# Patient Record
Sex: MALE | Race: WHITE | Employment: OTHER | URBAN - METROPOLITAN AREA
[De-identification: names, ages, dates, MRNs, and addresses within clinical notes are randomized per-mention and may not be internally consistent; named-entity substitution may affect disease eponyms.]

---

## 2020-09-10 RX ORDER — GABAPENTIN 600 MG/1
900 TABLET ORAL DAILY
COMMUNITY

## 2020-09-10 RX ORDER — ATORVASTATIN CALCIUM 80 MG/1
80 TABLET, FILM COATED ORAL EVERY EVENING
COMMUNITY

## 2020-09-10 RX ORDER — LISINOPRIL 10 MG/1
30 TABLET ORAL DAILY
COMMUNITY
End: 2021-06-04

## 2020-09-10 NOTE — PROGRESS NOTES
C-Difficile admission screening and protocol:     * Admitted with diarrhea? no     *Prior history of C-Diff. In last 3 months? no     *Antibiotic use in the past 6-8 weeks? no     *Prior hospitalization or nursing home in the last month?     no
Preoperative Screening for Elective Surgery/Invasive Procedures While COVID-19 present in the community     Have you tested positive or have been told to self-isolate for COVID-19 like symptoms within the past 28 days? no   Do you currently have any of the following symptoms? o Fever >100.0 F or 99.9 F in immunocompromised patients? no  o New onset cough, shortness of breath or difficulty breathing? no  o New onset sore throat, myalgia (muscle aches and pains), headache, loss of taste/smell or diarrhea? no   Have you had a potential exposure to COVID-19 within the past 14 days by:  o Close contact with a confirmed case? no  o Close contact with a healthcare worker,  or essential infrastructure worker (grocery store, restaurant, gas station, public utilities or transportation)? no  o Do you reside in a congregate setting such as; skilled nursing facility, adult home, correctional facility, homeless shelter or other institutional setting? no  o Have you had recent travel to a known COVID-19 hotspot? no    Indicate if the patient has a positive screen by answering yes to one or more of the above questions. Patients who test positive or screen positive prior to surgery or on the day of surgery should be evaluated in conjunction with the surgeon/proceduralist/anesthesiologist to determine the urgency of the procedure.
polish on your fingers or toes      For your safety, please do not wear any jewelry or body piercing's on the day of surgery. All jewelry must be removed. If you have dentures, they will be removed before going to operating room. For your convenience, we will provide you with a container. If you wear contact lenses or glasses, they will be removed, please bring a case for them. If you have a living will and a durable power of  for healthcare, please bring in a copy. As part of our patient safety program to minimize surgical site infections, we ask you to do the following:    · Please notify your surgeon if you develop any illness between         now and the  day of your surgery. · This includes a cough, cold, fever, sore throat, nausea,         or vomiting, and diarrhea, etc.  ·  Please notify your surgeon if you experience dizziness, shortness         of breath or blurred vision between now and the time of your surgery. Do not shave your operative site 96 hours prior to surgery. For face and neck surgery, men may use an electric razor 48 hours   prior to surgery. You may shower the night before surgery or the morning of   your surgery with an antibacterial soap. You will need to bring a photo ID and insurance card    [facility] has an onsite pharmacy, would you like to utilize our pharmacy     If you will be staying overnight and use a C-pap machine, please bring   your C-pap to hospital     Our goal is to provide you with excellent care, therefore, visitors will be limited to two(2) in the room at a time so that we may focus on providing this care for you. Please contact pre-admission testing if you have any further questions. [facility] phone number:  [phone]  Please note these are generalized instructions for all surgical cases, you may be provided with more specific instructions according to your surgery.

## 2020-09-14 ENCOUNTER — ANESTHESIA EVENT (OUTPATIENT)
Dept: SURGERY | Age: 65
End: 2020-09-14
Payer: MEDICARE

## 2020-09-14 ENCOUNTER — PREP FOR PROCEDURE (OUTPATIENT)
Dept: OPHTHALMOLOGY | Age: 65
End: 2020-09-14

## 2020-09-14 RX ORDER — CYCLOPENTOLATE HYDROCHLORIDE 10 MG/ML
1 SOLUTION/ DROPS OPHTHALMIC SEE ADMIN INSTRUCTIONS
Status: CANCELLED | OUTPATIENT
Start: 2020-09-14

## 2020-09-14 RX ORDER — PHENYLEPHRINE HYDROCHLORIDE 25 MG/ML
1 SOLUTION/ DROPS OPHTHALMIC SEE ADMIN INSTRUCTIONS
Status: CANCELLED | OUTPATIENT
Start: 2020-09-14

## 2020-09-14 RX ORDER — TETRACAINE HYDROCHLORIDE 5 MG/ML
1 SOLUTION OPHTHALMIC SEE ADMIN INSTRUCTIONS
Status: CANCELLED | OUTPATIENT
Start: 2020-09-14

## 2020-09-14 RX ORDER — SODIUM CHLORIDE 0.9 % (FLUSH) 0.9 %
10 SYRINGE (ML) INJECTION EVERY 12 HOURS SCHEDULED
Status: CANCELLED | OUTPATIENT
Start: 2020-09-14

## 2020-09-14 RX ORDER — SODIUM CHLORIDE 0.9 % (FLUSH) 0.9 %
10 SYRINGE (ML) INJECTION PRN
Status: CANCELLED | OUTPATIENT
Start: 2020-09-14

## 2020-09-14 RX ORDER — CIPROFLOXACIN HYDROCHLORIDE 3.5 MG/ML
1 SOLUTION/ DROPS TOPICAL SEE ADMIN INSTRUCTIONS
Status: CANCELLED | OUTPATIENT
Start: 2020-09-14

## 2020-09-15 ENCOUNTER — HOSPITAL ENCOUNTER (OUTPATIENT)
Age: 65
Setting detail: OUTPATIENT SURGERY
Discharge: HOME | End: 2020-09-15
Attending: OPHTHALMOLOGY | Admitting: OPHTHALMOLOGY
Payer: MEDICARE

## 2020-09-15 ENCOUNTER — ANESTHESIA (OUTPATIENT)
Dept: SURGERY | Age: 65
End: 2020-09-15
Payer: MEDICARE

## 2020-09-15 VITALS
OXYGEN SATURATION: 95 % | RESPIRATION RATE: 14 BRPM | DIASTOLIC BLOOD PRESSURE: 80 MMHG | SYSTOLIC BLOOD PRESSURE: 130 MMHG

## 2020-09-15 VITALS
TEMPERATURE: 97.2 F | BODY MASS INDEX: 26.37 KG/M2 | RESPIRATION RATE: 15 BRPM | HEART RATE: 66 BPM | OXYGEN SATURATION: 94 % | DIASTOLIC BLOOD PRESSURE: 80 MMHG | WEIGHT: 168 LBS | HEIGHT: 67 IN | SYSTOLIC BLOOD PRESSURE: 140 MMHG

## 2020-09-15 LAB
GLUCOSE BLD-MCNC: 117 MG/DL (ref 70–99)
PERFORMED ON: ABNORMAL

## 2020-09-15 PROCEDURE — 6360000002 HC RX W HCPCS: Performed by: NURSE ANESTHETIST, CERTIFIED REGISTERED

## 2020-09-15 PROCEDURE — V2632 POST CHMBR INTRAOCULAR LENS: HCPCS | Performed by: OPHTHALMOLOGY

## 2020-09-15 PROCEDURE — 3700000001 HC ADD 15 MINUTES (ANESTHESIA): Performed by: OPHTHALMOLOGY

## 2020-09-15 PROCEDURE — 6360000002 HC RX W HCPCS: Performed by: OPHTHALMOLOGY

## 2020-09-15 PROCEDURE — 2720000010 HC SURG SUPPLY STERILE: Performed by: OPHTHALMOLOGY

## 2020-09-15 PROCEDURE — 3600000004 HC SURGERY LEVEL 4 BASE: Performed by: OPHTHALMOLOGY

## 2020-09-15 PROCEDURE — 2580000003 HC RX 258: Performed by: ANESTHESIOLOGY

## 2020-09-15 PROCEDURE — 6370000000 HC RX 637 (ALT 250 FOR IP): Performed by: OPHTHALMOLOGY

## 2020-09-15 PROCEDURE — 3700000000 HC ANESTHESIA ATTENDED CARE: Performed by: OPHTHALMOLOGY

## 2020-09-15 PROCEDURE — 2500000003 HC RX 250 WO HCPCS: Performed by: OPHTHALMOLOGY

## 2020-09-15 PROCEDURE — 3600000014 HC SURGERY LEVEL 4 ADDTL 15MIN: Performed by: OPHTHALMOLOGY

## 2020-09-15 PROCEDURE — 7100000010 HC PHASE II RECOVERY - FIRST 15 MIN: Performed by: OPHTHALMOLOGY

## 2020-09-15 PROCEDURE — 2709999900 HC NON-CHARGEABLE SUPPLY: Performed by: OPHTHALMOLOGY

## 2020-09-15 DEVICE — LENS INTRAOCULAR 1 PC 21+ DIOPT 6X12.5 MM POST CHMBR FLD: Type: IMPLANTABLE DEVICE | Site: EYE | Status: FUNCTIONAL

## 2020-09-15 RX ORDER — PHENYLEPHRINE HCL 2.5 %
1 DROPS OPHTHALMIC (EYE) SEE ADMIN INSTRUCTIONS
Status: DISCONTINUED | OUTPATIENT
Start: 2020-09-15 | End: 2020-09-15 | Stop reason: HOSPADM

## 2020-09-15 RX ORDER — TETRACAINE HYDROCHLORIDE 5 MG/ML
1 SOLUTION OPHTHALMIC SEE ADMIN INSTRUCTIONS
Status: DISCONTINUED | OUTPATIENT
Start: 2020-09-15 | End: 2020-09-15 | Stop reason: HOSPADM

## 2020-09-15 RX ORDER — SODIUM CHLORIDE 0.9 % (FLUSH) 0.9 %
10 SYRINGE (ML) INJECTION EVERY 12 HOURS SCHEDULED
Status: DISCONTINUED | OUTPATIENT
Start: 2020-09-15 | End: 2020-09-15 | Stop reason: HOSPADM

## 2020-09-15 RX ORDER — SODIUM CHLORIDE 0.9 % (FLUSH) 0.9 %
10 SYRINGE (ML) INJECTION PRN
Status: DISCONTINUED | OUTPATIENT
Start: 2020-09-15 | End: 2020-09-15 | Stop reason: HOSPADM

## 2020-09-15 RX ORDER — CIPROFLOXACIN HYDROCHLORIDE 3.5 MG/ML
1 SOLUTION/ DROPS TOPICAL
Status: COMPLETED | OUTPATIENT
Start: 2020-09-15 | End: 2020-09-15

## 2020-09-15 RX ORDER — MIDAZOLAM HYDROCHLORIDE 1 MG/ML
INJECTION INTRAMUSCULAR; INTRAVENOUS PRN
Status: DISCONTINUED | OUTPATIENT
Start: 2020-09-15 | End: 2020-09-15 | Stop reason: SDUPTHER

## 2020-09-15 RX ORDER — OFLOXACIN 3 MG/ML
SOLUTION/ DROPS OPHTHALMIC
Status: COMPLETED | OUTPATIENT
Start: 2020-09-15 | End: 2020-09-15

## 2020-09-15 RX ORDER — CYCLOPENTOLATE HYDROCHLORIDE 10 MG/ML
1 SOLUTION/ DROPS OPHTHALMIC SEE ADMIN INSTRUCTIONS
Status: DISCONTINUED | OUTPATIENT
Start: 2020-09-15 | End: 2020-09-15 | Stop reason: HOSPADM

## 2020-09-15 RX ORDER — SODIUM CHLORIDE 0.9 % (FLUSH) 0.9 %
10 SYRINGE (ML) INJECTION EVERY 12 HOURS SCHEDULED
Status: DISCONTINUED | OUTPATIENT
Start: 2020-09-15 | End: 2020-09-15 | Stop reason: SDUPTHER

## 2020-09-15 RX ORDER — SODIUM CHLORIDE 9 MG/ML
INJECTION, SOLUTION INTRAVENOUS CONTINUOUS
Status: DISCONTINUED | OUTPATIENT
Start: 2020-09-15 | End: 2020-09-15 | Stop reason: HOSPADM

## 2020-09-15 RX ORDER — SODIUM CHLORIDE 0.9 % (FLUSH) 0.9 %
10 SYRINGE (ML) INJECTION PRN
Status: DISCONTINUED | OUTPATIENT
Start: 2020-09-15 | End: 2020-09-15 | Stop reason: DRUGHIGH

## 2020-09-15 RX ORDER — TETRACAINE HYDROCHLORIDE 5 MG/ML
SOLUTION OPHTHALMIC
Status: COMPLETED | OUTPATIENT
Start: 2020-09-15 | End: 2020-09-15

## 2020-09-15 RX ORDER — AMLODIPINE BESYLATE 5 MG/1
5 TABLET ORAL DAILY
COMMUNITY
Start: 2020-09-10 | End: 2021-06-04

## 2020-09-15 RX ORDER — BRIMONIDINE TARTRATE 2 MG/ML
SOLUTION/ DROPS OPHTHALMIC
Status: COMPLETED | OUTPATIENT
Start: 2020-09-15 | End: 2020-09-15

## 2020-09-15 RX ADMIN — CIPROFLOXACIN HYDROCHLORIDE 1 DROP: 3 SOLUTION/ DROPS OPHTHALMIC at 07:20

## 2020-09-15 RX ADMIN — CIPROFLOXACIN HYDROCHLORIDE 1 DROP: 3 SOLUTION/ DROPS OPHTHALMIC at 07:15

## 2020-09-15 RX ADMIN — CYCLOPENTOLATE HYDROCHLORIDE 1 DROP: 10 SOLUTION/ DROPS OPHTHALMIC at 07:16

## 2020-09-15 RX ADMIN — MIDAZOLAM 2 MG: 1 INJECTION INTRAMUSCULAR; INTRAVENOUS at 08:54

## 2020-09-15 RX ADMIN — CYCLOPENTOLATE HYDROCHLORIDE 1 DROP: 10 SOLUTION/ DROPS OPHTHALMIC at 07:26

## 2020-09-15 RX ADMIN — SODIUM CHLORIDE: 9 INJECTION, SOLUTION INTRAVENOUS at 07:31

## 2020-09-15 RX ADMIN — PHENYLEPHRINE HYDROCHLORIDE 1 DROP: 25 SOLUTION/ DROPS OPHTHALMIC at 07:28

## 2020-09-15 RX ADMIN — SODIUM CHLORIDE: 9 INJECTION, SOLUTION INTRAVENOUS at 08:03

## 2020-09-15 RX ADMIN — PHENYLEPHRINE HYDROCHLORIDE 1 DROP: 25 SOLUTION/ DROPS OPHTHALMIC at 07:17

## 2020-09-15 RX ADMIN — TETRACAINE HYDROCHLORIDE 1 DROP: 5 SOLUTION OPHTHALMIC at 07:14

## 2020-09-15 ASSESSMENT — PULMONARY FUNCTION TESTS
PIF_VALUE: 1
PIF_VALUE: 0
PIF_VALUE: 1
PIF_VALUE: 0
PIF_VALUE: 1

## 2020-09-15 ASSESSMENT — PAIN SCALES - GENERAL
PAINLEVEL_OUTOF10: 0
PAINLEVEL_OUTOF10: 0

## 2020-09-15 ASSESSMENT — PAIN - FUNCTIONAL ASSESSMENT: PAIN_FUNCTIONAL_ASSESSMENT: 0-10

## 2020-09-15 ASSESSMENT — ENCOUNTER SYMPTOMS: SHORTNESS OF BREATH: 0

## 2020-09-15 NOTE — OP NOTE
capsular bag. Provisc was removed from posterior to the implant and anterior to the implant by using a Cricket irrigation/aspiration hand piece. 0.2 ml Cefuroxime was placed in the anterior chamber. The corneal incision was checked and the incision was watertight without suture. The wire lid speculum was removed and the patient received topical Ciprofloxacin, and Alphagan P drops. At the conclusion of the procedure, the cornea was clear and the anterior chamber was deep, the pupil was round, and the implant was in good position. The patient tolerated the procedure well and was returned to the recovery room in good condition to be seen for follow-up the next day. ATTENDING ATTESTATION: I was present and scrubbed for the entire procedure.       ELECTRONICALLY SIGNED BY: Neftaly Yoon, 9/15/2020 9:25 AM

## 2020-09-15 NOTE — H&P
Date of Surgery Update:  Mony Orozco was seen, history and physical examination reviewed, and patient examined by me today. There have been no significant clinical changes since the completion of the previous history and physical.    The risk, benefits, and alternatives of the proposed procedure have been explained to the patient (or appropriate guardian) and understanding verbalized. All questions answered. Patient wishes to proceed.     Electronically signed by: Yaquelin Arias MD,9/15/2020,8:11 AM

## 2020-09-15 NOTE — ANESTHESIA PRE PROCEDURE
[facility] Department of Anesthesiology  Pre-Anesthesia Evaluation/Consultation       Name:  Jorden Villa  : 1955  Age:  75 y.o. MRN:  [id number]  Date: 9/15/2020           Surgeon: Surgeon(s):  Yu Ríos MD    Procedure: Procedure(s):  LEFT EYE Phacoemulsification with intraocular lens implant     No Known Allergies  There is no problem list on file for this patient. Past Medical History:   Diagnosis Date    Diabetes mellitus ([address]     Hepatitis C     was treated     Hyperlipidemia     Hypertension     Kidney damage     Neuropathy     feet     Past Surgical History:   Procedure Laterality Date    ABDOMINAL AORTIC ANEURYSM REPAIR      BACK SURGERY      pinched nerve     BACK SURGERY      COLONOSCOPY       Social History     Tobacco Use    Smoking status: Former Smoker     Types: Cigarettes    Smokeless tobacco: Never Used    Tobacco comment: quit 10 yrs ago    Substance Use Topics    Alcohol use: Yes     Comment: seldom     Drug use: Never     Medications  No current facility-administered medications on file prior to encounter. Current Outpatient Medications on File Prior to Encounter   Medication Sig Dispense Refill    amLODIPine (NORVASC) 5 MG tablet Take 5 mg by mouth daily      metFORMIN (GLUCOPHAGE) 500 MG tablet Take 500 mg by mouth 2 times daily (with meals)      gabapentin (NEURONTIN) 600 MG tablet Take 900 mg by mouth daily.       atorvastatin (LIPITOR) 80 MG tablet Take 80 mg by mouth daily      lisinopril (PRINIVIL;ZESTRIL) 10 MG tablet Take 30 mg by mouth daily Indications: takes in evening        Current Facility-Administered Medications   Medication Dose Route Frequency Provider Last Rate Last Dose    0.9 % sodium chloride infusion   Intravenous Continuous Ana Ventura  mL/hr at 09/15/20 0731      sodium chloride flush 0.9 % injection 10 mL  10 mL Intravenous 2 times per day Ana Ventura MD        sodium ALKPHOS, AST, ALT  BMP  No results found for: NA, K, CL, CO2, BUN, CREATININE, CALCIUM, GFRAA, LABGLOM, GLUCOSE  POCGlucose  No results for input(s): GLUCOSE in the last 72 hours. Coags  No results found for: PROTIME, INR, APTT  HCG (If Applicable) No results found for: PREGTESTUR, PREGSERUM, HCG, HCGQUANT   ABGs No results found for: PHART, PO2ART, VRP5KMG, TIU1WIM, BEART, S8LEUOZD   Type & Screen (If Applicable)  No results found for: LABABO, LABRH                         BMI: Wt Readings from Last 3 Encounters:       NPO Status:   Date of last liquid consumption: 09/15/20   Time of last liquid consumption: 0600(sip of water with meds)   Date of last solid food consumption: 09/14/20      Time of last solid consumption: 2130       Anesthesia Evaluation  Patient summary reviewed  Airway: Mallampati: II  TM distance: >3 FB   Neck ROM: full  Mouth opening: > = 3 FB Dental:          Pulmonary:       (-) COPD, asthma and shortness of breath                           Cardiovascular:    (+) hypertension:,     (-) valvular problems/murmurs, past MI, CAD, CABG/stent, dysrhythmias and  angina                Neuro/Psych:      (-) seizures, TIA and CVA           GI/Hepatic/Renal:   (+) hepatitis: C, liver disease:,      (-) GERD, PUD and no renal disease       Endo/Other:    (+) Diabetes, . Abdominal:           Vascular: negative vascular ROS. Anesthesia Plan      MAC     ASA 3     (I discussed local anesthesia with intravenous sedation to the   patient's satisfaction and agreed including risks and alternatives. The  patient has no further questions. HILDA THOMAS )  Induction: intravenous. Anesthetic plan and risks discussed with patient. Plan discussed with CRNA.                   This pre-anesthesia assessment may be used as a history and physical.    DOS STAFF ADDENDUM:    Pt seen and examined, chart reviewed (including anesthesia, drug and allergy history). No interval changes to history and physical examination. Anesthetic plan, risks, benefits, alternatives, and personnel involved discussed with patient. Patient verbalized an understanding and agrees to proceed.       Saran Yeboah MD  September 15, 2020  7:34 AM

## 2020-09-15 NOTE — ANESTHESIA POSTPROCEDURE EVALUATION
stable  OK to discharge from Stage I post anesthesia care.   Care transferred from Anesthesiology department on discharge from perioperative area

## 2020-09-22 NOTE — PROGRESS NOTES
C-Difficile admission screening and protocol:     * Admitted with diarrhea? no     *Prior history of C-Diff. In last 3 months? no     *Antibiotic use in the past 6-8 weeks? Yes eye drops     *Prior hospitalization or nursing home in the last month?     no

## 2020-09-22 NOTE — PROGRESS NOTES
[address]  time___0720_________        Surgery time__0850__________    Take the following medications with a sip of water: amlodipine     Do not eat or drink anything after 12:00 midnight prior to your surgery. This includes water chewing gum, mints and ice chips. You may brush your teeth and gargle the morning of your surgery, but do not swallow the water     Please see your family doctor/pediatrician for a history and physical and/or concerning medications. H&P 9/3/2020 Dr. Villanueva Balloon    Bring any test results/reports from your physicians office. If you are under the care of a heart doctor or specialist doctor, please be aware that you may be asked to them for clearance    You may be asked to stop blood thinners such as Coumadin, Plavix, Fragmin, Lovenox, etc., or any anti-inflammatories such as:  Aspirin, Ibuprofen, Advil, Naproxen prior to your surgery. We also ask that you stop any OTC medications such as fish oil, vitamin E, glucosamine, garlic, Multivitamins, COQ 10, etc.    We ask that you do not smoke 24 hours prior to surgery  We ask that you do not  drink any alcoholic beverages 24 hours prior to surgery     You must make arrangements for a responsible adult to take you home after your surgery. For your safety you will not be allowed to leave alone or drive yourself home. Your surgery will be cancelled if you do not have a ride home. Also for your safety, it is strongly suggested that someone stay with you the first 24 hours after your surgery. A parent or legal guardian must accompany a child scheduled for surgery and plan to stay at the hospital until the child is discharged. Please do not bring other children with you. For your comfort, please wear simple loose fitting clothing to the hospital.  Please do not bring valuables. Wear short sleeve button down shirt or loose fitting shirt. Bting eye drops or ointment.     Do not wear any make-up or nail polish on your fingers or toes      For your safety, please do not wear any jewelry or body piercing's on the day of surgery. All jewelry must be removed. If you have dentures, they will be removed before going to operating room. For your convenience, we will provide you with a container. If you wear contact lenses or glasses, they will be removed, please bring a case for them. If you have a living will and a durable power of  for healthcare, please bring in a copy. As part of our patient safety program to minimize surgical site infections, we ask you to do the following:    · Please notify your surgeon if you develop any illness between         now and the  day of your surgery. · This includes a cough, cold, fever, sore throat, nausea,         or vomiting, and diarrhea, etc.  ·  Please notify your surgeon if you experience dizziness, shortness         of breath or blurred vision between now and the time of your surgery. Do not shave your operative site 96 hours prior to surgery. For face and neck surgery, men may use an electric razor 48 hours   prior to surgery. You may shower the night before surgery or the morning of   your surgery with an antibacterial soap. You will need to bring a photo ID and insurance card    [facility] has an onsite pharmacy, would you like to utilize our pharmacy     If you will be staying overnight and use a C-pap machine, please bring   your C-pap to hospital     Our goal is to provide you with excellent care, therefore, visitors will be limited to two(2) in the room at a time so that we may focus on providing this care for you. Please contact pre-admission testing if you have any further questions. [facility] phone number:  [phone]  Please note these are generalized instructions for all surgical cases, you may be provided with more specific instructions according to your surgery.

## 2020-09-28 ENCOUNTER — ANESTHESIA EVENT (OUTPATIENT)
Dept: SURGERY | Age: 65
End: 2020-09-28
Payer: MEDICARE

## 2020-09-28 RX ORDER — SODIUM CHLORIDE 0.9 % (FLUSH) 0.9 %
10 SYRINGE (ML) INJECTION PRN
Status: CANCELLED | OUTPATIENT
Start: 2020-09-28

## 2020-09-28 RX ORDER — CIPROFLOXACIN HYDROCHLORIDE 3.5 MG/ML
1 SOLUTION/ DROPS TOPICAL SEE ADMIN INSTRUCTIONS
Status: CANCELLED | OUTPATIENT
Start: 2020-09-28

## 2020-09-28 RX ORDER — TETRACAINE HYDROCHLORIDE 5 MG/ML
1 SOLUTION OPHTHALMIC SEE ADMIN INSTRUCTIONS
Status: CANCELLED | OUTPATIENT
Start: 2020-09-28

## 2020-09-28 RX ORDER — PHENYLEPHRINE HYDROCHLORIDE 25 MG/ML
1 SOLUTION/ DROPS OPHTHALMIC SEE ADMIN INSTRUCTIONS
Status: CANCELLED | OUTPATIENT
Start: 2020-09-28

## 2020-09-28 RX ORDER — SODIUM CHLORIDE 0.9 % (FLUSH) 0.9 %
10 SYRINGE (ML) INJECTION EVERY 12 HOURS SCHEDULED
Status: CANCELLED | OUTPATIENT
Start: 2020-09-28

## 2020-09-28 RX ORDER — CYCLOPENTOLATE HYDROCHLORIDE 10 MG/ML
1 SOLUTION/ DROPS OPHTHALMIC SEE ADMIN INSTRUCTIONS
Status: CANCELLED | OUTPATIENT
Start: 2020-09-28

## 2020-09-28 NOTE — PROGRESS NOTES
[address]        Pre-Op Phone Call:     Patient Name: Pedro Mae     Telephone Information:   Mobile [phone]     Home phone:  [phone]    Surgery Time:    8:50 AM     Arrival Time:  0720   Left extended Message:  NA     Message left with:     Recent change in health status:  No     Advised of transportation/ policy:  Yes     NPO policy reviewed:  Yes pt     Advised to take morning heart/blood pressure medications with sips of water morning of surgery? Yes     Instructed to bring eye drops, photo identification, and insurance card day of surgery? Yes     Advised to wear short sleeved button down shirt (no T-shirt underneath):  Yes     Advised not to wear jewelry, hairpins, or pantyhose day of surgery? Yes     Advised not to wear make-up and to wash face day of surgery?   Yes    Remarks:        Electronically signed by:  Aaliyah Curiel RN at 9/28/2020 11:50 AM

## 2020-09-29 ENCOUNTER — HOSPITAL ENCOUNTER (OUTPATIENT)
Age: 65
Setting detail: OUTPATIENT SURGERY
Discharge: HOME | End: 2020-09-29
Attending: OPHTHALMOLOGY | Admitting: OPHTHALMOLOGY
Payer: MEDICARE

## 2020-09-29 ENCOUNTER — ANESTHESIA (OUTPATIENT)
Dept: SURGERY | Age: 65
End: 2020-09-29
Payer: MEDICARE

## 2020-09-29 VITALS
DIASTOLIC BLOOD PRESSURE: 77 MMHG | OXYGEN SATURATION: 95 % | BODY MASS INDEX: 26.37 KG/M2 | SYSTOLIC BLOOD PRESSURE: 131 MMHG | RESPIRATION RATE: 16 BRPM | WEIGHT: 168 LBS | HEART RATE: 66 BPM | TEMPERATURE: 97 F | HEIGHT: 67 IN

## 2020-09-29 VITALS
RESPIRATION RATE: 16 BRPM | SYSTOLIC BLOOD PRESSURE: 164 MMHG | DIASTOLIC BLOOD PRESSURE: 84 MMHG | OXYGEN SATURATION: 99 %

## 2020-09-29 LAB
GLUCOSE BLD-MCNC: 109 MG/DL (ref 70–99)
GLUCOSE BLD-MCNC: 111 MG/DL (ref 70–99)
PERFORMED ON: ABNORMAL
PERFORMED ON: ABNORMAL

## 2020-09-29 PROCEDURE — 6360000002 HC RX W HCPCS: Performed by: NURSE ANESTHETIST, CERTIFIED REGISTERED

## 2020-09-29 PROCEDURE — 2580000003 HC RX 258: Performed by: ANESTHESIOLOGY

## 2020-09-29 PROCEDURE — 2720000010 HC SURG SUPPLY STERILE: Performed by: OPHTHALMOLOGY

## 2020-09-29 PROCEDURE — 6370000000 HC RX 637 (ALT 250 FOR IP): Performed by: OPHTHALMOLOGY

## 2020-09-29 PROCEDURE — V2632 POST CHMBR INTRAOCULAR LENS: HCPCS | Performed by: OPHTHALMOLOGY

## 2020-09-29 PROCEDURE — 3700000001 HC ADD 15 MINUTES (ANESTHESIA): Performed by: OPHTHALMOLOGY

## 2020-09-29 PROCEDURE — 3700000000 HC ANESTHESIA ATTENDED CARE: Performed by: OPHTHALMOLOGY

## 2020-09-29 PROCEDURE — 2500000003 HC RX 250 WO HCPCS: Performed by: OPHTHALMOLOGY

## 2020-09-29 PROCEDURE — 3600000014 HC SURGERY LEVEL 4 ADDTL 15MIN: Performed by: OPHTHALMOLOGY

## 2020-09-29 PROCEDURE — 2709999900 HC NON-CHARGEABLE SUPPLY: Performed by: OPHTHALMOLOGY

## 2020-09-29 PROCEDURE — 3600000004 HC SURGERY LEVEL 4 BASE: Performed by: OPHTHALMOLOGY

## 2020-09-29 PROCEDURE — 7100000011 HC PHASE II RECOVERY - ADDTL 15 MIN: Performed by: OPHTHALMOLOGY

## 2020-09-29 PROCEDURE — 7100000010 HC PHASE II RECOVERY - FIRST 15 MIN: Performed by: OPHTHALMOLOGY

## 2020-09-29 PROCEDURE — 6360000002 HC RX W HCPCS: Performed by: OPHTHALMOLOGY

## 2020-09-29 DEVICE — LENS INTRAOCULAR BCNVX +20.5 DIOPT 6X12.5 MM ENVISTA: Type: IMPLANTABLE DEVICE | Site: EYE | Status: FUNCTIONAL

## 2020-09-29 RX ORDER — PHENYLEPHRINE HCL 2.5 %
1 DROPS OPHTHALMIC (EYE) SEE ADMIN INSTRUCTIONS
Status: COMPLETED | OUTPATIENT
Start: 2020-09-29 | End: 2020-09-29

## 2020-09-29 RX ORDER — MIDAZOLAM HYDROCHLORIDE 1 MG/ML
INJECTION INTRAMUSCULAR; INTRAVENOUS PRN
Status: DISCONTINUED | OUTPATIENT
Start: 2020-09-29 | End: 2020-09-29 | Stop reason: SDUPTHER

## 2020-09-29 RX ORDER — SODIUM CHLORIDE 0.9 % (FLUSH) 0.9 %
10 SYRINGE (ML) INJECTION EVERY 12 HOURS SCHEDULED
Status: DISCONTINUED | OUTPATIENT
Start: 2020-09-29 | End: 2020-09-29 | Stop reason: HOSPADM

## 2020-09-29 RX ORDER — SODIUM CHLORIDE 0.9 % (FLUSH) 0.9 %
10 SYRINGE (ML) INJECTION PRN
Status: DISCONTINUED | OUTPATIENT
Start: 2020-09-29 | End: 2020-09-29 | Stop reason: HOSPADM

## 2020-09-29 RX ORDER — TETRACAINE HYDROCHLORIDE 5 MG/ML
SOLUTION OPHTHALMIC
Status: COMPLETED | OUTPATIENT
Start: 2020-09-29 | End: 2020-09-29

## 2020-09-29 RX ORDER — SODIUM CHLORIDE 9 MG/ML
INJECTION, SOLUTION INTRAVENOUS CONTINUOUS
Status: DISCONTINUED | OUTPATIENT
Start: 2020-09-29 | End: 2020-09-29 | Stop reason: HOSPADM

## 2020-09-29 RX ORDER — PROMETHAZINE HYDROCHLORIDE 25 MG/ML
6.25 INJECTION, SOLUTION INTRAMUSCULAR; INTRAVENOUS
Status: DISCONTINUED | OUTPATIENT
Start: 2020-09-29 | End: 2020-09-29 | Stop reason: HOSPADM

## 2020-09-29 RX ORDER — TETRACAINE HYDROCHLORIDE 5 MG/ML
1 SOLUTION OPHTHALMIC SEE ADMIN INSTRUCTIONS
Status: DISCONTINUED | OUTPATIENT
Start: 2020-09-29 | End: 2020-09-29 | Stop reason: HOSPADM

## 2020-09-29 RX ORDER — CYCLOPENTOLATE HYDROCHLORIDE 10 MG/ML
1 SOLUTION/ DROPS OPHTHALMIC SEE ADMIN INSTRUCTIONS
Status: COMPLETED | OUTPATIENT
Start: 2020-09-29 | End: 2020-09-29

## 2020-09-29 RX ORDER — FENTANYL CITRATE 50 UG/ML
INJECTION, SOLUTION INTRAMUSCULAR; INTRAVENOUS PRN
Status: DISCONTINUED | OUTPATIENT
Start: 2020-09-29 | End: 2020-09-29 | Stop reason: SDUPTHER

## 2020-09-29 RX ORDER — SODIUM CHLORIDE 0.9 % (FLUSH) 0.9 %
10 SYRINGE (ML) INJECTION PRN
Status: DISCONTINUED | OUTPATIENT
Start: 2020-09-29 | End: 2020-09-29 | Stop reason: SDUPTHER

## 2020-09-29 RX ORDER — CIPROFLOXACIN HYDROCHLORIDE 3.5 MG/ML
SOLUTION/ DROPS TOPICAL
Status: COMPLETED | OUTPATIENT
Start: 2020-09-29 | End: 2020-09-29

## 2020-09-29 RX ORDER — SODIUM CHLORIDE 0.9 % (FLUSH) 0.9 %
10 SYRINGE (ML) INJECTION EVERY 12 HOURS SCHEDULED
Status: DISCONTINUED | OUTPATIENT
Start: 2020-09-29 | End: 2020-09-29 | Stop reason: SDUPTHER

## 2020-09-29 RX ORDER — ONDANSETRON 2 MG/ML
4 INJECTION INTRAMUSCULAR; INTRAVENOUS
Status: DISCONTINUED | OUTPATIENT
Start: 2020-09-29 | End: 2020-09-29 | Stop reason: HOSPADM

## 2020-09-29 RX ORDER — PREDNISOLONE ACETATE 10 MG/ML
SUSPENSION/ DROPS OPHTHALMIC
Status: DISCONTINUED | OUTPATIENT
Start: 2020-09-29 | End: 2020-09-29 | Stop reason: ALTCHOICE

## 2020-09-29 RX ORDER — CIPROFLOXACIN HYDROCHLORIDE 3.5 MG/ML
1 SOLUTION/ DROPS TOPICAL
Status: ACTIVE | OUTPATIENT
Start: 2020-09-29 | End: 2020-09-29

## 2020-09-29 RX ORDER — BRIMONIDINE TARTRATE 2 MG/ML
SOLUTION/ DROPS OPHTHALMIC
Status: COMPLETED | OUTPATIENT
Start: 2020-09-29 | End: 2020-09-29

## 2020-09-29 RX ORDER — LABETALOL HYDROCHLORIDE 5 MG/ML
5 INJECTION, SOLUTION INTRAVENOUS EVERY 10 MIN PRN
Status: DISCONTINUED | OUTPATIENT
Start: 2020-09-29 | End: 2020-09-29 | Stop reason: HOSPADM

## 2020-09-29 RX ADMIN — PHENYLEPHRINE HYDROCHLORIDE 1 DROP: 25 SOLUTION/ DROPS OPHTHALMIC at 07:49

## 2020-09-29 RX ADMIN — CYCLOPENTOLATE HYDROCHLORIDE 1 DROP: 10 SOLUTION/ DROPS OPHTHALMIC at 07:35

## 2020-09-29 RX ADMIN — PHENYLEPHRINE HYDROCHLORIDE 1 DROP: 25 SOLUTION/ DROPS OPHTHALMIC at 07:35

## 2020-09-29 RX ADMIN — FENTANYL CITRATE 50 MCG: 50 INJECTION INTRAMUSCULAR; INTRAVENOUS at 08:52

## 2020-09-29 RX ADMIN — CYCLOPENTOLATE HYDROCHLORIDE 1 DROP: 10 SOLUTION/ DROPS OPHTHALMIC at 07:42

## 2020-09-29 RX ADMIN — TETRACAINE HYDROCHLORIDE 1 DROP: 5 SOLUTION OPHTHALMIC at 07:35

## 2020-09-29 RX ADMIN — FENTANYL CITRATE 50 MCG: 50 INJECTION INTRAMUSCULAR; INTRAVENOUS at 08:55

## 2020-09-29 RX ADMIN — SODIUM CHLORIDE: 9 INJECTION, SOLUTION INTRAVENOUS at 07:47

## 2020-09-29 RX ADMIN — CIPROFLOXACIN 1 DROP: 3 SOLUTION OPHTHALMIC at 07:39

## 2020-09-29 RX ADMIN — SODIUM CHLORIDE: 9 INJECTION, SOLUTION INTRAVENOUS at 08:49

## 2020-09-29 RX ADMIN — PHENYLEPHRINE HYDROCHLORIDE 1 DROP: 25 SOLUTION/ DROPS OPHTHALMIC at 07:42

## 2020-09-29 RX ADMIN — MIDAZOLAM 2 MG: 1 INJECTION INTRAMUSCULAR; INTRAVENOUS at 08:50

## 2020-09-29 RX ADMIN — CIPROFLOXACIN 1 DROP: 3 SOLUTION OPHTHALMIC at 07:32

## 2020-09-29 RX ADMIN — CYCLOPENTOLATE HYDROCHLORIDE 1 DROP: 10 SOLUTION/ DROPS OPHTHALMIC at 07:49

## 2020-09-29 ASSESSMENT — PAIN SCALES - GENERAL
PAINLEVEL_OUTOF10: 0
PAINLEVEL_OUTOF10: 0

## 2020-09-29 ASSESSMENT — PAIN - FUNCTIONAL ASSESSMENT: PAIN_FUNCTIONAL_ASSESSMENT: 0-10

## 2020-09-29 NOTE — H&P
Date of Surgery Update:  Edilberto Reyes was seen, history and physical examination reviewed, and patient examined by me today. There have been no significant clinical changes since the completion of the previous history and physical.    The risk, benefits, and alternatives of the proposed procedure have been explained to the patient (or appropriate guardian) and understanding verbalized. All questions answered. Patient wishes to proceed.     Electronically signed by: Lexus Austin MD,9/29/2020,8:09 AM

## 2020-09-29 NOTE — OP NOTE
[facility]  [address]  [phone]      9/29/2020    Patient name: Piter Dos Santos  YOB: 1955  MRN: [id number]         OPERATIVE REPORT      DATE OF OPERATION: 9/29/2020     SURGEON: Trae Bishop  ASSISTANT(S): None            PREOPERATIVE DIAGNOSIS:  Age-related Nuclear Sclerotic Cataract -right eye  POSTOPERATIVE DIAGNOSIS:  same    OPERATION PERFORMED: Procedure(s):  RIGHT EYE Phacoemulsification with intraocular lens implant -right eye  ANESTHESIA:   Monitor Anesthesia Care  ESTIMATED BLOOD LOSS:  None  COMPLICATIONS:  None  IOL USED:  [employer]ch [employer] MX60E +20.5    INDICATIONS FOR PROCEDURE:    Best corrected visual acuity of slit lamp confirmation cataract:  20/100 glare  Patient's evaluation of visual status of operative eye:  Decreased vision with reading and night vision times months. DESCRIPTION OF PROCEDURE: Piter Dos Santos, is a 75 y.o. male. After topical  anesthesia and pupillary dilation were obtained, the patient was prepped and draped in the usual sterile fashion for cataract implant surgery. A wire lid speculum was placed and the operating microscope was moved into position over the eye. A paracentesis clear corneal incision was made with a super blade. Approximately 0.5 ml Epi-Shugarcaine was injected into the anterior chamber. This was followed by Viscoat to fill the anterior chamber. A temporal clear corneal incision was made with a 2.75 mm keratome and a bent needle and Utrata forceps were used to perform an anterior capsulorhexis. Hydrodissection was performed on the cataract. Phacoemulsification of the nucleus was performed. Cortex was removed using an automated irrigation/aspiration hand piece and vacuuming of the posterior capsule was also carried out with the same hand piece on minimal settings. Provisc was used to re-inflate the capsular bag and a foldable intraocular lens was placed into the capsular bag. Provisc was removed from posterior to the implant and anterior to the implant by using a Cricket irrigation/aspiration hand piece. 0.2 ml Cefuroxime was placed in the anterior chamber. The corneal incision was checked and the incision was watertight without suture. The wire lid speculum was removed and the patient received topical Ciprofloxacin, and Alphagan P drops. At the conclusion of the procedure, the cornea was clear and the anterior chamber was deep, the pupil was round, and the implant was in good position. The patient tolerated the procedure well and was returned to the recovery room in good condition to be seen for follow-up the next day. ATTENDING ATTESTATION: I was present and scrubbed for the entire procedure.       ELECTRONICALLY SIGNED BY: Rony Madden, 9/29/2020 9:14 AM

## 2020-09-29 NOTE — ANESTHESIA PRE PROCEDURE
[facility] Department of Anesthesiology  Pre-Anesthesia Evaluation/Consultation       Name:  Almer Klinefelter  : 1955  Age:  75 y.o. MRN:  [id number]  Date: 2020           Surgeon: Surgeon(s):  Ayden Grier MD    Procedure: Procedure(s):  RIGHT EYE Phacoemulsification with intraocular lens implant     No Known Allergies  There is no problem list on file for this patient. Past Medical History:   Diagnosis Date    Diabetes mellitus ([address]     Hepatitis C     was treated     Hyperlipidemia     Hypertension     Kidney damage     Neuropathy     feet     Past Surgical History:   Procedure Laterality Date    ABDOMINAL AORTIC ANEURYSM REPAIR      BACK SURGERY      pinched nerve     BACK SURGERY      COLONOSCOPY      INTRACAPSULAR CATARACT EXTRACTION Left 9/15/2020    LEFT EYE Phacoemulsification with intraocular lens implant performed by Ayden Grier MD at [address] [place] [address]     Social History     Tobacco Use    Smoking status: Former Smoker     Types: Cigarettes    Smokeless tobacco: Never Used    Tobacco comment: quit 10 yrs ago    Substance Use Topics    Alcohol use: Yes     Comment: seldom     Drug use: Never     Medications  No current facility-administered medications on file prior to encounter. Current Outpatient Medications on File Prior to Encounter   Medication Sig Dispense Refill    amLODIPine (NORVASC) 5 MG tablet Take 5 mg by mouth daily      metFORMIN (GLUCOPHAGE) 500 MG tablet Take 500 mg by mouth 2 times daily (with meals)      gabapentin (NEURONTIN) 600 MG tablet Take 900 mg by mouth daily.       atorvastatin (LIPITOR) 80 MG tablet Take 80 mg by mouth daily      lisinopril (PRINIVIL;ZESTRIL) 10 MG tablet Take 30 mg by mouth daily Indications: takes in evening        Current Facility-Administered Medications   Medication Dose Route Frequency Provider Last Rate Last Dose    0.9 % sodium chloride infusion   Intravenous Continuous Ana Ventura MD        sodium chloride flush 0.9 % injection 10 mL  10 mL Intravenous 2 times per day Ana Ventura MD        sodium chloride flush 0.9 % injection 10 mL  10 mL Intravenous PRN Ana Ventura MD        ciprofloxacin (CILOXAN) 0.3 % ophthalmic solution 1 drop  1 drop Right Eye Q5 Candie Brooks MD        cyclopentolate (CYCLOGYL) 1 % ophthalmic solution 1 drop  1 drop Right Eye See Admin Instructions Yu Ríos MD        phenylephrine (MYDFRIN) 2.5 % ophthalmic solution 1 drop  1 drop Right Eye See Admin Instructions Yu Ríos MD        tetracaine (TETRAVISC) 0.5 % ophthalmic solution 1 drop  1 drop Right Eye See Admin Instructions Yu Ríos MD         Vital Signs (Current)   Vitals:    20   BP: (!) 165/89   Pulse: 72   Resp: 15   Temp: 97.8 °F (36.6 °C)   SpO2: 97%     Vital Signs Statistics (for past 48 hrs)     Temp  Av.8 °F (36.6 °C)  Min: 97.8 °F (36.6 °C)   Min taken time: 20  Max: 97.8 °F (36.6 °C)   Max taken time: 20  Pulse  Av  Min: 67   Min taken time: 20  Max: 67   Max taken time: 20  Resp  Avg: 15  Min: 13   Min taken time: 20  Max: 13   Max taken time: 20  BP  Min: 165/89   Min taken time: 20  Max: 165/89   Max taken time: 20  SpO2  Av %  Min: 97 %   Min taken time: 20  Max: 97 %   Max taken time: 20    BP Readings from Last 3 Encounters:   20 (!) 165/89   09/15/20 130/80   09/15/20 (!) 140/80     BMI  Body mass index is 26.31 kg/m². Estimated body mass index is 26.31 kg/m² as calculated from the following:    Height as of this encounter: 5' 7\" (1.702 m). Weight as of this encounter: 168 lb (76.2 kg).     CBC No results found for: WBC, RBC, HGB, HCT, MCV, RDW, PLT  CMP  No results found for: NA, K, CL, CO2, BUN, CREATININE, GFRAA, AGRATIO, LABGLOM, GLUCOSE, PROT, CALCIUM, BILITOT, ALKPHOS, AST, ALT  BMP No results found for: NA, K, CL, CO2, BUN, CREATININE, CALCIUM, GFRAA, LABGLOM, GLUCOSE  POCGlucose  No results for input(s): GLUCOSE in the last 72 hours. Coags  No results found for: PROTIME, INR, APTT  HCG (If Applicable) No results found for: PREGTESTUR, PREGSERUM, HCG, HCGQUANT   ABGs No results found for: PHART, PO2ART, OAH2TIC, JSF6PUD, BEART, T2TCTFQT   Type & Screen (If Applicable)  No results found for: LABABO, LABRH                         BMI: Wt Readings from Last 3 Encounters:       NPO Status: 8 hours                          Anesthesia Evaluation  Patient summary reviewed no history of anesthetic complications:   Airway: Mallampati: III  TM distance: >3 FB   Neck ROM: full   Dental: normal exam         Pulmonary:Negative Pulmonary ROS and normal exam                               Cardiovascular:  Exercise tolerance: good (>4 METS),   (+) hypertension:,         Rhythm: regular  Rate: normal           Beta Blocker:  Not on Beta Blocker         Neuro/Psych:   Negative Neuro/Psych ROS              GI/Hepatic/Renal:   (+) hepatitis (s/p treatment): C, liver disease:,           Endo/Other:    (+) DiabetesType II DM, , .                 Abdominal:           Vascular:   + PVD, aortic or cerebral (AAA s/p repair), . Anesthesia Plan      MAC     ASA 3       Induction: intravenous. Anesthetic plan and risks discussed with patient. Plan discussed with CRNA. This pre-anesthesia assessment may be used as a history and physical.    DOS STAFF ADDENDUM:    Pt seen and examined, chart reviewed (including anesthesia, drug and allergy history). No interval changes to history and physical examination. Anesthetic plan, risks, benefits, alternatives, and personnel involved discussed with patient. Questions and concerns addressed. Patient(family) verbalized an understanding and agrees to proceed.       Scout Marquez MD  September 29, 2020  7:36 AM

## 2020-09-29 NOTE — ANESTHESIA POSTPROCEDURE EVALUATION
[employer] Department of Anesthesiology  Post-Anesthesia Note       Name:  Gina Campos                                  Age:  75 y.o. MRN:  [id number]     Last Vitals & Oxygen Saturation: BP (!) 158/75   Pulse 66   Temp 97 °F (36.1 °C)   Resp 16   Ht 5' 7\" (1.702 m)   Wt 168 lb (76.2 kg)   SpO2 96%   BMI 26.31 kg/m²   Patient Vitals for the past 4 hrs:   BP Temp Pulse Resp SpO2 Height Weight   09/29/20 0917 (!) 158/75 97 °F (36.1 °C) 66 16 96 % -- --   09/29/20 0733 (!) 165/89 97.8 °F (36.6 °C) 72 15 97 % 5' 7\" (1.702 m) 168 lb (76.2 kg)       Level of consciousness:  Awake, alert    Respiratory: Respirations easy, no distress. Stable. Cardiovascular: Hemodynamically stable. Hydration: Adequate. PONV: Adequately managed. Post-op pain: Adequately controlled. Post-op assessment: Tolerated anesthetic well without complication. Complications:  None.     Dante Dhaliwal MD  September 29, 2020   9:37 AM

## 2021-03-17 ENCOUNTER — IMMUNIZATION (OUTPATIENT)
Dept: FAMILY MEDICINE CLINIC | Age: 66
End: 2021-03-17
Payer: MEDICARE

## 2021-03-17 PROCEDURE — 91300 COVID-19, PFIZER VACCINE 30MCG/0.3ML DOSE: CPT | Performed by: FAMILY MEDICINE

## 2021-03-17 PROCEDURE — 0001A COVID-19, PFIZER VACCINE 30MCG/0.3ML DOSE: CPT | Performed by: FAMILY MEDICINE

## 2021-04-07 ENCOUNTER — IMMUNIZATION (OUTPATIENT)
Dept: FAMILY MEDICINE CLINIC | Age: 66
End: 2021-04-07
Payer: MEDICARE

## 2021-04-07 PROCEDURE — 91300 COVID-19, PFIZER VACCINE 30MCG/0.3ML DOSE: CPT | Performed by: FAMILY MEDICINE

## 2021-04-07 PROCEDURE — 0002A COVID-19, PFIZER VACCINE 30MCG/0.3ML DOSE: CPT | Performed by: FAMILY MEDICINE

## 2021-06-02 ENCOUNTER — CLINICAL DOCUMENTATION (OUTPATIENT)
Dept: OTHER | Age: 66
End: 2021-06-02

## 2021-06-04 ENCOUNTER — APPOINTMENT (OUTPATIENT)
Dept: GENERAL RADIOLOGY | Age: 66
DRG: 308 | End: 2021-06-04
Payer: MEDICARE

## 2021-06-04 ENCOUNTER — HOSPITAL ENCOUNTER (INPATIENT)
Age: 66
LOS: 1 days | Discharge: HOME | DRG: 308 | End: 2021-06-06
Attending: EMERGENCY MEDICINE | Admitting: STUDENT IN AN ORGANIZED HEALTH CARE EDUCATION/TRAINING PROGRAM
Payer: MEDICARE

## 2021-06-04 DIAGNOSIS — R06.00 DYSPNEA AND RESPIRATORY ABNORMALITIES: ICD-10-CM

## 2021-06-04 DIAGNOSIS — I44.1 SECOND DEGREE AV BLOCK: Primary | ICD-10-CM

## 2021-06-04 DIAGNOSIS — I10 HYPERTENSION, UNSPECIFIED TYPE: ICD-10-CM

## 2021-06-04 DIAGNOSIS — R06.89 DYSPNEA AND RESPIRATORY ABNORMALITIES: ICD-10-CM

## 2021-06-04 LAB
A/G RATIO: 1.9 (ref 1.1–2.2)
ALBUMIN SERPL-MCNC: 4 G/DL (ref 3.4–5)
ALP BLD-CCNC: 90 U/L (ref 40–129)
ALT SERPL-CCNC: 9 U/L (ref 10–40)
ANION GAP SERPL CALCULATED.3IONS-SCNC: 8 MMOL/L (ref 3–16)
AST SERPL-CCNC: 17 U/L (ref 15–37)
BASOPHILS ABSOLUTE: 0.1 K/UL (ref 0–0.2)
BASOPHILS RELATIVE PERCENT: 1 %
BILIRUB SERPL-MCNC: 0.6 MG/DL (ref 0–1)
BUN BLDV-MCNC: 29 MG/DL (ref 7–20)
CALCIUM SERPL-MCNC: 9 MG/DL (ref 8.3–10.6)
CHLORIDE BLD-SCNC: 106 MMOL/L (ref 99–110)
CO2: 24 MMOL/L (ref 21–32)
CREAT SERPL-MCNC: 1.9 MG/DL (ref 0.8–1.3)
EOSINOPHILS ABSOLUTE: 0.2 K/UL (ref 0–0.6)
EOSINOPHILS RELATIVE PERCENT: 3.6 %
GFR AFRICAN AMERICAN: 43
GFR NON-AFRICAN AMERICAN: 35
GLOBULIN: 2.1 G/DL
GLUCOSE BLD-MCNC: 129 MG/DL (ref 70–99)
HCT VFR BLD CALC: 35.9 % (ref 40.5–52.5)
HEMOGLOBIN: 12.4 G/DL (ref 13.5–17.5)
LYMPHOCYTES ABSOLUTE: 1.4 K/UL (ref 1–5.1)
LYMPHOCYTES RELATIVE PERCENT: 20.9 %
MCH RBC QN AUTO: 31.8 PG (ref 26–34)
MCHC RBC AUTO-ENTMCNC: 34.6 G/DL (ref 31–36)
MCV RBC AUTO: 91.7 FL (ref 80–100)
MONOCYTES ABSOLUTE: 0.5 K/UL (ref 0–1.3)
MONOCYTES RELATIVE PERCENT: 8.1 %
NEUTROPHILS ABSOLUTE: 4.4 K/UL (ref 1.7–7.7)
NEUTROPHILS RELATIVE PERCENT: 66.4 %
PDW BLD-RTO: 13.1 % (ref 12.4–15.4)
PLATELET # BLD: 140 K/UL (ref 135–450)
PMV BLD AUTO: 9.1 FL (ref 5–10.5)
POTASSIUM REFLEX MAGNESIUM: 4.1 MMOL/L (ref 3.5–5.1)
PRO-BNP: 2832 PG/ML (ref 0–124)
RBC # BLD: 3.91 M/UL (ref 4.2–5.9)
SODIUM BLD-SCNC: 138 MMOL/L (ref 136–145)
TOTAL PROTEIN: 6.1 G/DL (ref 6.4–8.2)
TROPONIN: <0.01 NG/ML
WBC # BLD: 6.7 K/UL (ref 4–11)

## 2021-06-04 PROCEDURE — 80053 COMPREHEN METABOLIC PANEL: CPT

## 2021-06-04 PROCEDURE — 84484 ASSAY OF TROPONIN QUANT: CPT

## 2021-06-04 PROCEDURE — 71045 X-RAY EXAM CHEST 1 VIEW: CPT

## 2021-06-04 PROCEDURE — 99284 EMERGENCY DEPT VISIT MOD MDM: CPT

## 2021-06-04 PROCEDURE — 84443 ASSAY THYROID STIM HORMONE: CPT

## 2021-06-04 PROCEDURE — 2500000003 HC RX 250 WO HCPCS: Performed by: EMERGENCY MEDICINE

## 2021-06-04 PROCEDURE — 93005 ELECTROCARDIOGRAM TRACING: CPT

## 2021-06-04 PROCEDURE — 96366 THER/PROPH/DIAG IV INF ADDON: CPT

## 2021-06-04 PROCEDURE — 85025 COMPLETE CBC W/AUTO DIFF WBC: CPT

## 2021-06-04 PROCEDURE — 36415 COLL VENOUS BLD VENIPUNCTURE: CPT

## 2021-06-04 PROCEDURE — 83880 ASSAY OF NATRIURETIC PEPTIDE: CPT

## 2021-06-04 PROCEDURE — 96365 THER/PROPH/DIAG IV INF INIT: CPT

## 2021-06-04 PROCEDURE — 6370000000 HC RX 637 (ALT 250 FOR IP): Performed by: EMERGENCY MEDICINE

## 2021-06-04 PROCEDURE — 84439 ASSAY OF FREE THYROXINE: CPT

## 2021-06-04 RX ORDER — NAPROXEN 250 MG/1
250 TABLET ORAL DAILY PRN
Status: ON HOLD | COMMUNITY
End: 2021-06-06 | Stop reason: HOSPADM

## 2021-06-04 RX ORDER — LISINOPRIL 40 MG/1
40 TABLET ORAL DAILY
COMMUNITY
Start: 2020-12-21

## 2021-06-04 RX ORDER — NITROGLYCERIN 20 MG/100ML
5-200 INJECTION INTRAVENOUS CONTINUOUS
Status: DISCONTINUED | OUTPATIENT
Start: 2021-06-04 | End: 2021-06-05

## 2021-06-04 RX ORDER — NITROGLYCERIN 0.4 MG/1
0.4 TABLET SUBLINGUAL EVERY 5 MIN PRN
Status: DISCONTINUED | OUTPATIENT
Start: 2021-06-04 | End: 2021-06-04

## 2021-06-04 RX ORDER — ASPIRIN 81 MG/1
324 TABLET, CHEWABLE ORAL ONCE
Status: COMPLETED | OUTPATIENT
Start: 2021-06-04 | End: 2021-06-04

## 2021-06-04 RX ADMIN — ASPIRIN 324 MG: 81 TABLET, CHEWABLE ORAL at 21:12

## 2021-06-04 RX ADMIN — NITROGLYCERIN 5 MCG/MIN: 20 INJECTION INTRAVENOUS at 21:59

## 2021-06-05 ENCOUNTER — APPOINTMENT (OUTPATIENT)
Dept: CT IMAGING | Age: 66
DRG: 308 | End: 2021-06-05
Payer: MEDICARE

## 2021-06-05 PROBLEM — N17.9 AKI (ACUTE KIDNEY INJURY) (HCC): Status: ACTIVE | Noted: 2021-06-05

## 2021-06-05 PROBLEM — I45.9 HEART BLOCK: Status: ACTIVE | Noted: 2021-06-05

## 2021-06-05 PROBLEM — I16.0 HYPERTENSIVE URGENCY: Status: ACTIVE | Noted: 2021-06-05

## 2021-06-05 PROBLEM — J90 PLEURAL EFFUSION, RIGHT: Status: ACTIVE | Noted: 2021-06-05

## 2021-06-05 PROBLEM — R06.02 SOB (SHORTNESS OF BREATH): Status: ACTIVE | Noted: 2021-06-05

## 2021-06-05 PROBLEM — R53.1 GENERALIZED WEAKNESS: Status: ACTIVE | Noted: 2021-06-05

## 2021-06-05 PROBLEM — E11.9 DM2 (DIABETES MELLITUS, TYPE 2) (HCC): Status: ACTIVE | Noted: 2021-06-05

## 2021-06-05 LAB
ANION GAP SERPL CALCULATED.3IONS-SCNC: 10 MMOL/L (ref 3–16)
BASOPHILS ABSOLUTE: 0.1 K/UL (ref 0–0.2)
BASOPHILS RELATIVE PERCENT: 1 %
BUN BLDV-MCNC: 30 MG/DL (ref 7–20)
CALCIUM SERPL-MCNC: 9.1 MG/DL (ref 8.3–10.6)
CHLORIDE BLD-SCNC: 108 MMOL/L (ref 99–110)
CO2: 24 MMOL/L (ref 21–32)
CREAT SERPL-MCNC: 1.7 MG/DL (ref 0.8–1.3)
EOSINOPHILS ABSOLUTE: 0.3 K/UL (ref 0–0.6)
EOSINOPHILS RELATIVE PERCENT: 4.1 %
GFR AFRICAN AMERICAN: 49
GFR NON-AFRICAN AMERICAN: 40
GLUCOSE BLD-MCNC: 103 MG/DL (ref 70–99)
GLUCOSE BLD-MCNC: 105 MG/DL (ref 70–99)
GLUCOSE BLD-MCNC: 112 MG/DL (ref 70–99)
GLUCOSE BLD-MCNC: 98 MG/DL (ref 70–99)
GLUCOSE BLD-MCNC: 99 MG/DL (ref 70–99)
HCT VFR BLD CALC: 35.1 % (ref 40.5–52.5)
HEMOGLOBIN: 12.1 G/DL (ref 13.5–17.5)
LV EF: 58 %
LVEF MODALITY: NORMAL
LYMPHOCYTES ABSOLUTE: 1.5 K/UL (ref 1–5.1)
LYMPHOCYTES RELATIVE PERCENT: 24.4 %
MAGNESIUM: 1.9 MG/DL (ref 1.8–2.4)
MCH RBC QN AUTO: 31.9 PG (ref 26–34)
MCHC RBC AUTO-ENTMCNC: 34.5 G/DL (ref 31–36)
MCV RBC AUTO: 92.3 FL (ref 80–100)
MONOCYTES ABSOLUTE: 0.6 K/UL (ref 0–1.3)
MONOCYTES RELATIVE PERCENT: 8.9 %
NEUTROPHILS ABSOLUTE: 3.9 K/UL (ref 1.7–7.7)
NEUTROPHILS RELATIVE PERCENT: 61.6 %
PDW BLD-RTO: 13 % (ref 12.4–15.4)
PERFORMED ON: ABNORMAL
PERFORMED ON: ABNORMAL
PERFORMED ON: NORMAL
PERFORMED ON: NORMAL
PLATELET # BLD: 139 K/UL (ref 135–450)
PMV BLD AUTO: 9.1 FL (ref 5–10.5)
POTASSIUM SERPL-SCNC: 4.8 MMOL/L (ref 3.5–5.1)
PROCALCITONIN: 0.11 NG/ML (ref 0–0.15)
RBC # BLD: 3.8 M/UL (ref 4.2–5.9)
SARS-COV-2, NAAT: NOT DETECTED
SODIUM BLD-SCNC: 142 MMOL/L (ref 136–145)
T4 FREE: 1.3 NG/DL (ref 0.9–1.8)
TROPONIN: <0.01 NG/ML
TROPONIN: <0.01 NG/ML
TSH REFLEX FT4: 5.36 UIU/ML (ref 0.27–4.2)
WBC # BLD: 6.3 K/UL (ref 4–11)

## 2021-06-05 PROCEDURE — 6370000000 HC RX 637 (ALT 250 FOR IP): Performed by: INTERNAL MEDICINE

## 2021-06-05 PROCEDURE — 71250 CT THORAX DX C-: CPT

## 2021-06-05 PROCEDURE — 6360000004 HC RX CONTRAST MEDICATION: Performed by: INTERNAL MEDICINE

## 2021-06-05 PROCEDURE — 83735 ASSAY OF MAGNESIUM: CPT

## 2021-06-05 PROCEDURE — 99223 1ST HOSP IP/OBS HIGH 75: CPT | Performed by: INTERNAL MEDICINE

## 2021-06-05 PROCEDURE — 2100000000 HC CCU R&B

## 2021-06-05 PROCEDURE — 84145 PROCALCITONIN (PCT): CPT

## 2021-06-05 PROCEDURE — 84484 ASSAY OF TROPONIN QUANT: CPT

## 2021-06-05 PROCEDURE — 85025 COMPLETE CBC W/AUTO DIFF WBC: CPT

## 2021-06-05 PROCEDURE — 87635 SARS-COV-2 COVID-19 AMP PRB: CPT

## 2021-06-05 PROCEDURE — 6360000002 HC RX W HCPCS: Performed by: STUDENT IN AN ORGANIZED HEALTH CARE EDUCATION/TRAINING PROGRAM

## 2021-06-05 PROCEDURE — 36415 COLL VENOUS BLD VENIPUNCTURE: CPT

## 2021-06-05 PROCEDURE — 94761 N-INVAS EAR/PLS OXIMETRY MLT: CPT

## 2021-06-05 PROCEDURE — 94640 AIRWAY INHALATION TREATMENT: CPT

## 2021-06-05 PROCEDURE — 74174 CTA ABD&PLVS W/CONTRAST: CPT

## 2021-06-05 PROCEDURE — 2580000003 HC RX 258: Performed by: STUDENT IN AN ORGANIZED HEALTH CARE EDUCATION/TRAINING PROGRAM

## 2021-06-05 PROCEDURE — 93306 TTE W/DOPPLER COMPLETE: CPT

## 2021-06-05 PROCEDURE — 80048 BASIC METABOLIC PNL TOTAL CA: CPT

## 2021-06-05 PROCEDURE — 6370000000 HC RX 637 (ALT 250 FOR IP): Performed by: STUDENT IN AN ORGANIZED HEALTH CARE EDUCATION/TRAINING PROGRAM

## 2021-06-05 RX ORDER — AMLODIPINE BESYLATE 5 MG/1
5 TABLET ORAL DAILY
Status: DISCONTINUED | OUTPATIENT
Start: 2021-06-05 | End: 2021-06-05

## 2021-06-05 RX ORDER — FUROSEMIDE 20 MG/1
20 TABLET ORAL ONCE
Status: COMPLETED | OUTPATIENT
Start: 2021-06-05 | End: 2021-06-05

## 2021-06-05 RX ORDER — AMLODIPINE BESYLATE 10 MG/1
10 TABLET ORAL DAILY
Status: DISCONTINUED | OUTPATIENT
Start: 2021-06-06 | End: 2021-06-06 | Stop reason: HOSPADM

## 2021-06-05 RX ORDER — NITROGLYCERIN 20 MG/100ML
5-200 INJECTION INTRAVENOUS CONTINUOUS
Status: DISCONTINUED | OUTPATIENT
Start: 2021-06-05 | End: 2021-06-05

## 2021-06-05 RX ORDER — ACETAMINOPHEN 650 MG/1
650 SUPPOSITORY RECTAL EVERY 6 HOURS PRN
Status: DISCONTINUED | OUTPATIENT
Start: 2021-06-05 | End: 2021-06-06 | Stop reason: HOSPADM

## 2021-06-05 RX ORDER — ATORVASTATIN CALCIUM 80 MG/1
80 TABLET, FILM COATED ORAL EVERY EVENING
Status: DISCONTINUED | OUTPATIENT
Start: 2021-06-05 | End: 2021-06-06 | Stop reason: HOSPADM

## 2021-06-05 RX ORDER — AMLODIPINE BESYLATE 5 MG/1
5 TABLET ORAL ONCE
Status: COMPLETED | OUTPATIENT
Start: 2021-06-05 | End: 2021-06-05

## 2021-06-05 RX ORDER — IPRATROPIUM BROMIDE AND ALBUTEROL SULFATE 2.5; .5 MG/3ML; MG/3ML
1 SOLUTION RESPIRATORY (INHALATION) EVERY 8 HOURS
Status: DISCONTINUED | OUTPATIENT
Start: 2021-06-05 | End: 2021-06-06 | Stop reason: HOSPADM

## 2021-06-05 RX ORDER — HYDRALAZINE HYDROCHLORIDE 25 MG/1
25 TABLET, FILM COATED ORAL EVERY 8 HOURS SCHEDULED
Status: DISCONTINUED | OUTPATIENT
Start: 2021-06-05 | End: 2021-06-06 | Stop reason: HOSPADM

## 2021-06-05 RX ORDER — ONDANSETRON 4 MG/1
4 TABLET, ORALLY DISINTEGRATING ORAL EVERY 8 HOURS PRN
Status: DISCONTINUED | OUTPATIENT
Start: 2021-06-05 | End: 2021-06-06 | Stop reason: HOSPADM

## 2021-06-05 RX ORDER — GABAPENTIN 300 MG/1
900 CAPSULE ORAL DAILY
Status: DISCONTINUED | OUTPATIENT
Start: 2021-06-05 | End: 2021-06-06 | Stop reason: HOSPADM

## 2021-06-05 RX ORDER — DEXTROSE MONOHYDRATE 25 G/50ML
12.5 INJECTION, SOLUTION INTRAVENOUS PRN
Status: DISCONTINUED | OUTPATIENT
Start: 2021-06-05 | End: 2021-06-06 | Stop reason: HOSPADM

## 2021-06-05 RX ORDER — SODIUM CHLORIDE 9 MG/ML
25 INJECTION, SOLUTION INTRAVENOUS PRN
Status: DISCONTINUED | OUTPATIENT
Start: 2021-06-05 | End: 2021-06-06 | Stop reason: HOSPADM

## 2021-06-05 RX ORDER — NICOTINE POLACRILEX 4 MG
15 LOZENGE BUCCAL PRN
Status: DISCONTINUED | OUTPATIENT
Start: 2021-06-05 | End: 2021-06-06 | Stop reason: HOSPADM

## 2021-06-05 RX ORDER — SODIUM CHLORIDE 0.9 % (FLUSH) 0.9 %
5-40 SYRINGE (ML) INJECTION PRN
Status: DISCONTINUED | OUTPATIENT
Start: 2021-06-05 | End: 2021-06-06 | Stop reason: HOSPADM

## 2021-06-05 RX ORDER — ACETAMINOPHEN 325 MG/1
650 TABLET ORAL EVERY 6 HOURS PRN
Status: DISCONTINUED | OUTPATIENT
Start: 2021-06-05 | End: 2021-06-06 | Stop reason: HOSPADM

## 2021-06-05 RX ORDER — ONDANSETRON 2 MG/ML
4 INJECTION INTRAMUSCULAR; INTRAVENOUS EVERY 6 HOURS PRN
Status: DISCONTINUED | OUTPATIENT
Start: 2021-06-05 | End: 2021-06-06 | Stop reason: HOSPADM

## 2021-06-05 RX ORDER — LISINOPRIL 40 MG/1
40 TABLET ORAL DAILY
Status: DISCONTINUED | OUTPATIENT
Start: 2021-06-05 | End: 2021-06-06 | Stop reason: HOSPADM

## 2021-06-05 RX ORDER — HYDRALAZINE HYDROCHLORIDE 20 MG/ML
5 INJECTION INTRAMUSCULAR; INTRAVENOUS EVERY 6 HOURS PRN
Status: DISCONTINUED | OUTPATIENT
Start: 2021-06-05 | End: 2021-06-06 | Stop reason: HOSPADM

## 2021-06-05 RX ORDER — DEXTROSE MONOHYDRATE 50 MG/ML
100 INJECTION, SOLUTION INTRAVENOUS PRN
Status: DISCONTINUED | OUTPATIENT
Start: 2021-06-05 | End: 2021-06-06 | Stop reason: HOSPADM

## 2021-06-05 RX ORDER — SODIUM CHLORIDE 0.9 % (FLUSH) 0.9 %
5-40 SYRINGE (ML) INJECTION EVERY 12 HOURS SCHEDULED
Status: DISCONTINUED | OUTPATIENT
Start: 2021-06-05 | End: 2021-06-06 | Stop reason: HOSPADM

## 2021-06-05 RX ADMIN — ATORVASTATIN CALCIUM 80 MG: 80 TABLET, FILM COATED ORAL at 20:28

## 2021-06-05 RX ADMIN — HYDRALAZINE HYDROCHLORIDE 5 MG: 20 INJECTION INTRAMUSCULAR; INTRAVENOUS at 13:59

## 2021-06-05 RX ADMIN — IOPAMIDOL 75 ML: 755 INJECTION, SOLUTION INTRAVENOUS at 13:19

## 2021-06-05 RX ADMIN — Medication 10 ML: at 20:28

## 2021-06-05 RX ADMIN — HYDRALAZINE HYDROCHLORIDE 25 MG: 25 TABLET, FILM COATED ORAL at 21:16

## 2021-06-05 RX ADMIN — AMLODIPINE BESYLATE 5 MG: 5 TABLET ORAL at 12:58

## 2021-06-05 RX ADMIN — FUROSEMIDE 20 MG: 20 TABLET ORAL at 09:02

## 2021-06-05 RX ADMIN — LISINOPRIL 40 MG: 40 TABLET ORAL at 08:37

## 2021-06-05 RX ADMIN — ACETAMINOPHEN 650 MG: 325 TABLET ORAL at 06:53

## 2021-06-05 RX ADMIN — IPRATROPIUM BROMIDE AND ALBUTEROL SULFATE 1 AMPULE: .5; 3 SOLUTION RESPIRATORY (INHALATION) at 23:47

## 2021-06-05 RX ADMIN — GABAPENTIN 900 MG: 300 CAPSULE ORAL at 08:37

## 2021-06-05 RX ADMIN — IPRATROPIUM BROMIDE AND ALBUTEROL SULFATE 1 AMPULE: .5; 3 SOLUTION RESPIRATORY (INHALATION) at 16:06

## 2021-06-05 RX ADMIN — HYDRALAZINE HYDROCHLORIDE 25 MG: 25 TABLET, FILM COATED ORAL at 15:24

## 2021-06-05 RX ADMIN — AMLODIPINE BESYLATE 5 MG: 5 TABLET ORAL at 09:02

## 2021-06-05 ASSESSMENT — PAIN SCALES - GENERAL
PAINLEVEL_OUTOF10: 0
PAINLEVEL_OUTOF10: 0
PAINLEVEL_OUTOF10: 3
PAINLEVEL_OUTOF10: 0

## 2021-06-05 ASSESSMENT — PAIN - FUNCTIONAL ASSESSMENT: PAIN_FUNCTIONAL_ASSESSMENT: ACTIVITIES ARE NOT PREVENTED

## 2021-06-05 ASSESSMENT — PAIN DESCRIPTION - LOCATION: LOCATION: HEAD

## 2021-06-05 ASSESSMENT — PAIN DESCRIPTION - PAIN TYPE: TYPE: ACUTE PAIN

## 2021-06-05 ASSESSMENT — PAIN DESCRIPTION - DESCRIPTORS: DESCRIPTORS: HEADACHE

## 2021-06-05 ASSESSMENT — PAIN DESCRIPTION - ORIENTATION: ORIENTATION: OTHER (COMMENT);ANTERIOR

## 2021-06-05 NOTE — PROGRESS NOTES
0700: Handoff with Cordell Gaines RN    9490: nitro gtt handoff. Pt SBP >160, did titrate up on nitro. Pt denies any needs. Already ordered breakfast. Explained will need to check blood glucose level before eating. Pt seems annoyed that we will be checking it so frequently as he does not at home. States his A1C gets checked every three months and since its been normal, checked have been moved to every 6 months, which he will have one checked at the end of June. Only takes metformin at home and has lost 50lbs by diet changes for diabetes. 0740:  at bedside for trop and procal to be collected. Blood glucose obtained 105. No insulin coverage needed. 0745: echo tech at bedside. 0800: Shift assessment    0845: Dr. Chantelle Jenkins at bedside to evaluate the patient. New orders to decrease nitro gtt to 25mcg/min as >50 mcg/min has no result for decreasing BP. Start amlodipine and give one time dose of lasix. Also orders for CTA of abd and pelvis. 2602: CT control room calling will plan for 1pm since pt needs to be NPO for 4hours. 7713: PO medications given. Nitro gtt decreased to 25mcg/min and updated on POC. Pt wanting to be \"fixed\" today and wanting to leave the hospital. Explained to patient that he may not leave until tomorrow or Monday. 1200: reassessment complete. Unchanged. 1250: SBP in the 200's reached out to Dr. Chantelle Jenkins. Orders to increase norvasc to 10mg daily. Extra 5mg to be given. Hold on PRN hydralazine     1300: transport at bedside    1325: back from CT. Did void. 1340: Dr. Chris Duran at bedside. Pt remains hypertensive. Requesting manual BP.     1345: pt standing up to void. Will wait 5 minutes before taking manual.     1352: Manual /75. 5mg of amlodipine given 1 hour ago. 1410: Dr. Chantelle Jenkins aware of SBP. No new orders at this time. Will monitor. Pt now responding to lasix that was given @9am     1500: SBP remains elevated. Dr. Chantelle Jenkins aware of SBP and CTA results.  Will try hydralazine 25mg PO Q8.     1600: reassessment remains unchanged.  sbp in the 150's    1625: Handoff with Charity Deutsch RN

## 2021-06-05 NOTE — H&P
Hospital Medicine History & Physical      PCP: Rafa Jeffrey    Date of Admission: 6/4/2021    Date of Service: Pt seen/examined on 6/4/2021 and Admitted to Inpatient    Chief Complaint: Generalized weakness, slow heart rate and high blood pressure, shortness of breath mainly on exertion      History Of Present Illness: The patient is a 77 y.o. male with past medical history of type 2 diabetes, previous treated hepatitis C, hypertension, previous abdominal aortic aneurysm repair, and some bilateral lower extremity neuropathy who presents to [facility] with initially for the past 2 weeks notably that he has felt increasing levels of generalized weakness intermittently as well as persistent shortness of breath on exertion but was uncertain as to why and then became alarmed that 3 days days ago he has had to check his blood pressure and it was noted to be very high around the 180s to 200s and he then waited 3 days and rechecked his blood pressures at [facility] and he was still noted to be high and so he had called his primary care physician who directed him to come to the ED for further evaluation. He also noted that his heart rate at the time was fairly low but was uncertain as to what his baseline heart rate is known to be. He regards himself is very healthy and although mentions his diabetes and high blood pressure he has not been in the hospital other than for previous procedures before. Other than the generalized weakness and shortness of breath, patient denies any other recent symptoms of fever, chills, dizziness, syncope, vision change, focal weakness, numbness or tingling other than neuropathy to the feet, nausea/vomiting/diarrhea, blood in urine/stool/sputum. He denies any previous history of cancer or any history in the past of heart failure.   He has never iterative reconstruction, and/or weight based adjustment of the   mA/kV was utilized to reduce the radiation dose to as low as reasonably   achievable.       COMPARISON:   None.       HISTORY:   ORDERING SYSTEM PROVIDED HISTORY: SOB   TECHNOLOGIST PROVIDED HISTORY:   Reason for exam:->SOB   Decision Support Exception - unselect if not a suspected or confirmed   emergency medical condition->Emergency Medical Condition (MA)   Reason for Exam: sob       FINDINGS:   Mediastinum: Heart is borderline enlarged.  No pericardial effusion. Vergil Huntley   is within normal limits in size.  Pulmonary arteries are enlarged. . Coronary   artery calcifications noted.       Lungs/pleura: Central airways are patent. Diffuse interlobular septal   thickening.  0.7 cm lingular nodule.  Scattered granulomas.  Centrilobular   emphysema.  Irregular right lower lobe nodule measuring 1.6 cm.  Masslike   right lower lobe opacity.  Small complex right pleural effusion with right   pleural thickening and calcification.  Trace left pleural effusion.  No   pneumothorax.       Soft Tissues/Bones: Suboptimal evaluation for adenopathy without intravenous   contrast. Borderline enlarged mediastinal lymph nodes.  Calcified mediastinal   and hilar lymph nodes.  Suboptimal evaluation for hilar adenopathy without   intravenous contrast. Scattered degenerative changes noted in the visualized   spine without spondylolisthesis.       Upper Abdomen: Granulomas in the liver and spleen.  Partially visualized   infrarenal abdominal aortic aneurysm.           Impression   1. Congestive heart failure. 2. Masslike opacity in the right lower lobe.  Correlate with presentation to   exclude infection.  Follow-up recommended. 3. Pulmonary nodules measuring up to 1.6 cm.  Three-month follow-up   recommended.    4. Complex right pleural effusion with pleural thickening.  Correlate   clinically to exclude empyema.  Follow-up recommended as malignant effusion   is in the differential.   5. Partially visualized infrarenal abdominal aortic aneurysm.  CT abdomen   pelvis recommended for complete evaluation. 6. Other findings as described.               CBC   Recent Labs     06/04/21 2129 06/05/21 0248   WBC 6.7 6.3   HGB 12.4* 12.1*   HCT 35.9* 35.1*    139      RENAL  Recent Labs     06/04/21 2129 06/05/21 0248    142   K 4.1 4.8    108   CO2 24 24   BUN 29* 30*   CREATININE 1.9* 1.7*     LFT'S  Recent Labs     06/04/21 2129   AST 17   ALT 9*   BILITOT 0.6   ALKPHOS 90     COAG  No results for input(s): INR in the last 72 hours.   CARDIAC ENZYMES  Recent Labs     06/04/21 2129 06/05/21 0248   TROPONINI <0.01 <0.01       U/A:  No results found for: NITRITE, COLORU, WBCUA, RBCUA, MUCUS, BACTERIA, CLARITYU, SPECGRAV, LEUKOCYTESUR, BLOODU, GLUCOSEU, AMORPHOUS    ABG  No results found for: WFK3RAZ, BEART, Z3SZZQGP, PHART, THGBART, CZA7MPU, PO2ART, NXY6OPD        Active Hospital Problems    Diagnosis Date Noted    Heart block [I45.9] 06/05/2021     Priority: High    Generalized weakness [R53.1] 06/05/2021     Priority: High    SOB (shortness of breath) [R06.02] 06/05/2021     Priority: High    Hypertensive urgency [I16.0] 06/05/2021     Priority: Medium    Pleural effusion, right [J90] 06/05/2021     Priority: Medium    ORALIA (acute kidney injury) ([facility] [address] [N17.9] 06/05/2021     Priority: Low    DM2 (diabetes mellitus, type 2) ([facility] [address] [E11.9] 06/05/2021         PHYSICIANS CERTIFICATION:    I certify that Emily Andujar is expected to be hospitalized for greater than 2 midnights based on the following assessment and plan:      ASSESSMENT/PLAN:  · Patient denies any previous history of heart failure and denies any other infectious symptoms, CT scan findings are somewhat concerning for possible underlying malignancy, potentially patient's generalized weakness is more secondary due to his cardiac arrhythmia at this time although his blood pressure continues to be elevated, was unable to give IV contrast during the CT scan due to patient's acute kidney injury which might be secondary due to his hypertension. There are some findings of congestive heart failure on the CT scan but will await further evaluation until starting diuretics, at this time low suspicion for infectious process or empyema  · Holding diuretics for now until echo is confirmed  · Ultrasound-guided thoracentesis for right pleural effusion to test the fluid for possible infection versus malignancy  · If unable to get ultrasound-guided thoracentesis by radiology, pulmonary is consulted for further evaluation of pleural effusion and potential bedside thoracentesis  · Obtain transthoracic echo in the morning to evaluate heart failure  · Keeping patient on nitroglycerin IV infusion to treat for hypertensive urgency, hydralazine IV as needed as well  · Restarting patient's home antihypertensives in the morning  · Trending troponins, repeating labs in the morning  · Sliding scale insulin and Accu-Cheks for glucose control  · Cardiology consult for concern for hypertensive urgency in 2-1 AV block, as well as heart failure    DVT Prophylaxis: Lovenox  Diet: ADULT DIET; Regular; 4 carb choices (60 gm/meal); Low Sodium (2 gm)  Code Status: Full Code  PT/OT Eval Status: Ambulatory    Dispo -pending clinical course       Erik Templeton, DO    Thank you [facility] for the opportunity to be involved in this patient's care. If you have any questions or concerns please feel free to contact me at [phone].

## 2021-06-05 NOTE — ED NOTES
Pt wanted to walk to bathroom and pt advised his heart rate is 39 and he is not going to walk to the bathroom. Pt got a bedside commode and was allowed to stand still connected to monitor. Pt was little angry but finally realized we were just trying to protect him and he calmed down.       Nilda Andrews  06/05/21 0117

## 2021-06-05 NOTE — PLAN OF CARE
Problem: Cardiac:  Goal: Ability to maintain vital signs within normal range will improve  Description: Ability to maintain vital signs within normal range will improve  Outcome: Ongoing  A: Cardiovascular assessment. Continuous cardiac telemetry monitor. VS per routine. Titrate the NTG drip to keep SBP < 160 mmHg. Monitor for any symptomatic bradycardia. Notify MD of cardiac rhythm changes, any episodes of symptomatic bradycardia, or uncontrolled BP. Problem: Falls - Risk of:  Goal: Will remain free from falls  Description: Will remain free from falls  Outcome: Ongoing  A: Fall prevention education. Non-skid socks to remain in place. Place call light, bedside table, and personal items within his reach. Keep bed locked at the lowest position. Intentional rounding.     Electronically signed by Jaycee Coles RN on 6/5/2021 at 4:29 AM

## 2021-06-05 NOTE — PROGRESS NOTES
4 Eyes Skin Assessment     NAME:  Shavon Ordaz  YOB: 1955  MEDICAL RECORD NUMBER:  [id number]    The patient is being assess for  Admission    I agree that 2 RN's have performed a thorough Head to Toe Skin Assessment on the patient. ALL assessment sites listed below have been assessed. Areas assessed by both nurses:    Head, Face, Ears, Shoulders, Back, Chest, Arms, Elbows, Hands, Sacrum. Buttock, Coccyx, Ischium and Legs. Feet and Heels        Does the Patient have a Wound?  No noted wound(s)       [place] Prevention initiated:  NA   Wound Care Orders initiated:  NA    Pressure Injury (Stage 3,4, Unstageable, DTI, NWPT, and Complex wounds) if present place consult order under [de-identified] NA    New and Established Ostomies if present place consult order under : NA      Nurse 1 eSignature: Electronically signed by Yvonne Pacheco RN on 6/5/21 at 3:04 AM EDT    **SHARE this note so that the co-signing nurse is able to place an eSignature**    Nurse 2 eSignature: Electronically signed by Ruben Harden RN on 6/5/21 at 5:06 AM EDT

## 2021-06-05 NOTE — PROGRESS NOTES
Hospitalist Progress Note      PCP: Aria Walton    Date of Admission: 6/4/2021        Subjective: No chest pain shortness of breath or headache, no palpitation no nausea      Medications:  Reviewed    Infusion Medications    dextrose      sodium chloride       Scheduled Medications    atorvastatin  80 mg Oral QPM    gabapentin  900 mg Oral Daily    lisinopril  40 mg Oral Daily    insulin lispro  0-6 Units Subcutaneous TID WC    insulin lispro  0-3 Units Subcutaneous Nightly    sodium chloride flush  5-40 mL Intravenous 2 times per day    enoxaparin  40 mg Subcutaneous Daily    ipratropium-albuterol  1 ampule Inhalation Q8H    amLODIPine  5 mg Oral Daily     PRN Meds: glucose, dextrose, glucagon (rDNA), dextrose, sodium chloride flush, sodium chloride, ondansetron **OR** ondansetron, acetaminophen **OR** acetaminophen, perflutren lipid microspheres, hydrALAZINE      Intake/Output Summary (Last 24 hours) at 6/5/2021 1217  Last data filed at 6/5/2021 1387  Gross per 24 hour   Intake 310 ml   Output 190 ml   Net 120 ml       Physical Exam Performed:    BP (!) 187/58   Pulse (!) 35   Temp 98.5 °F (36.9 °C) (Oral)   Resp 12   Ht 5' 7\" (1.702 m)   Wt 173 lb 8 oz (78.7 kg)   SpO2 95%   BMI 27.17 kg/m²     General appearance: No apparent distress  Neck: Supple  Respiratory:  Normal respiratory effort. Clear to auscultation, bilaterally without Rales/Wheezes/Rhonchi. Cardiovascular: Regular rate and rhythm with normal S1/S2 without murmurs, rubs or gallops. Abdomen: Soft, non-tender, non-distended with normal bowel sounds. Musculoskeletal: No clubbing, cyanosis  Skin: Skin color, texture, turgor normal.  No rashes or lesions.   Neurologic:  No focal weakness   Psychiatric: Alert and oriented  Capillary Refill: Brisk,3 seconds, normal   Peripheral Pulses: +2 palpable, equal bilaterally       Labs:   Recent Labs     06/04/21 2129 06/05/21  0248   WBC 6.7 6.3   HGB 12.4* 12.1*   HCT 35.9* 35.1*   PLT following, p.o. medications added. 3.  Pulmonary mass/nodules, follow-up as outpatient and get PET scan or CT scan. 4.  Bilateral pleural effusion, likely cardiac etiology, follow-up as outpatient with pulmonary for further monitoring and thoracentesis if deemed necessary  5. Acute likely on chronic diastolic heart failure, likely due to uncontrolled hypertension, p.o. Lasix for now  6. Acute renal failure, some improvement in creatinine noted      Diet: ADULT DIET; Regular; 4 carb choices (60 gm/meal);  Low Sodium (2 gm)  Code Status: Full Code      Rod Jeronimo MD

## 2021-06-05 NOTE — ED TRIAGE NOTES
Pt arrived to ED for a complaint of hypertension, bradycardia, and shortness of breath. Pt states that he checked his blood pressure and pulse at home a few days ago that read high blood pressure and a pulse of 40. Pt states that he then went to [facility] to double check his pulse and confirmed his pulse was low. Pt states that he called his PCP who then told him to come to the ED for evaluation. Pt states that he began having shortness of breath as well and states that it has gradually gotten worse. Pt states that he also lost a kidney due to a stent procedure malfunction. Pt denies any chest pain or pain anywhere else. Pt denies any other concerns at this time. VS noted and stable. Patient A&Ox4. Respirations easy and unlabored. Skin warm and dry and appropriate for ethnicity. No acute distress noted at this time. Patient placed on continuous telemetry and pulse ox upon arrival to room.

## 2021-06-05 NOTE — PROGRESS NOTES
Late entry due to patient care. @ 0205 - Admitted from the ED, via stretcher, alert and oriented (x4), SB w/ 2nd degree AV block (Type II) on the monitor (HR on the 30s), on O2 @ 2L/min, not in any distress, and denies any pain. He's on NTG drip @ 20 mcg/min. NTG drip will be titrated to keep SBP < 160 mmHg (see MAR for titrations). @ 5182 - This RN informed Dr. López Camacho, through [facility] of the pt.'s admission. The message was, \"Pt. is already admitted in room 1307. He's not in any distress, HR on the 30s, sometimes dipping-down to 27-29/min, has 2nd degree (Mobitz II) AV block on the monitor, on O2 @ 2L/min, and currently on NTG @ 30 mcg/min (to titrate). Current vitals: HR=30 bpm, RR = 12/min, BP = 168/54, O2 sat = 92%. AM labs already resulted and the BUN/ Creatinine = 30/1.7 & 2nd Troponin <0.01. CT chest results are already in, if you don't mind looking at it. Thanks! \"    @ [phone] - Dr. Mark Gallego this RN's [employer] message. @ 36 - Dr. Stephanie Gibbs is at the bedside and explained the CT of the chest results to the pt. Dr. Stephanie Gibbs informed the pt. that a thoracentesis will be ordered for him and the MD also explained what is thoracentesis. The pt. agreed. @ 0350 - Per. Dr. López Camacho, no need to call Cardiology service again for the ordered consult tonight, since the said service was already aware of the consult when the pt. was still in the ED.    @ 0403 - Per Dr. López Camacho, call the ordered Pulmonology consult during AM shift (not tonight). @ 0435 - Reassessment unchanged. @ 0670 - Dr. Patric Estrada is rounding in CVU. This RN made Dr. Linda Smalls aware of the ordered consult for the pt. MD acknowledged. @ 0630 - AM bedside rounds w/ Dr. Patric Estrada.    @ 0342 - Bedside shift hand-off done w/ oncoming CARROLL Dougherty, to assume pt. care. This RN handed-off the pt. in a stable condition.     Electronically signed by Ida Valentino RN on 6/5/2021 at 7:15 AM

## 2021-06-05 NOTE — ED PROVIDER NOTES
[facility]      Pt Name: Olan Romberg  MRN: [id number]  [place] 1955  Date of evaluation: 6/4/2021  Provider: Mona Ayala DO    CHIEF COMPLAINT  Chief Complaint   Patient presents with    Hypertension     increasing HTN per patient saw PCP who told him to come to ER, also SOB    Shortness of Breath       I wore personal protective equipment when I was in the room the entire time. This includes gloves, N95 mask, face shield, and a glove over my stethoscope for protection. HPI  Olan Romberg is a 77 y.o. male who presents with shortness of breath and increasing dyspnea on exertion has been present for 2 weeks. He has been noticing his blood pressures been high for the past 4 days. He went to the drugstore to check his own monitor and it was he has a history of a aneurysm of the abdomen has been repaired. He states he lost one kidney at that time. He denies any chest pains. He states his shortness of breath gets worse when he lays down. He does not wear oxygen at home. Is been lightheaded for the past couple days. He denies any new medications except for 1 month ago he was started on lisinopril. Standing up makes it worse and rest makes it better. ? REVIEW OF SYSTEMS  All systems negative except as noted in the HPI. Reviewed Nurses' notes and concur. No LMP for male patient. PAST MEDICAL HISTORY  Past Medical History:   Diagnosis Date    Diabetes mellitus ([facility] [address]     Hepatitis C     was treated     Hyperlipidemia     Hypertension     Kidney damage     Neuropathy     feet       FAMILY HISTORY  Family History   Problem Relation Age of Onset    Cancer Mother     Cancer Father         bile duct       SOCIAL HISTORY   reports that he has quit smoking. His smoking use included cigarettes. He has never used smokeless tobacco. He reports current alcohol use. He reports that he does not use drugs.     SURGICAL No hepatosplenomegaly, No masses, No pulsatile masses, No distension, normal bowel sounds  Skin: Warm, Dry, No erythema, No rash. No diaphoresis noted. Extremities: No edema, No tenderness, No cyanosis, No clubbing. No amputations, capillary refill less than 2 seconds. Musculoskeletal:  no major deformities noted.   Neurologic: Alert & oriented x 3  Psychiatric: Affect normal, Mood normal.    ?  LABORATORY  Labs Reviewed   CBC WITH AUTO DIFFERENTIAL - Abnormal; Notable for the following components:       Result Value    RBC 3.91 (*)     Hemoglobin 12.4 (*)     Hematocrit 35.9 (*)     All other components within normal limits    Narrative:     Performed at:  [address] [facility]   Phone [phone]   COMPREHENSIVE METABOLIC PANEL W/ REFLEX TO MG FOR LOW K - Abnormal; Notable for the following components:    Glucose 129 (*)     BUN 29 (*)     CREATININE 1.9 (*)     GFR Non- 35 (*)     GFR  43 (*)     Total Protein 6.1 (*)     ALT 9 (*)     All other components within normal limits    Narrative:     Performed at:  [address] [facility] [employer] [address]   Phone [phone]   BRAIN NATRIURETIC PEPTIDE - Abnormal; Notable for the following components:    Pro-BNP 2,832 (*)     All other components within normal limits    Narrative:     Performed at:  [address] [place]   Phone [phone]   TROPONIN    Narrative:     Performed at:  [address] [facility] [employer] [address]   Phone [phone]       Interpreted by emergency department physician  Time performed: 2016   Time read: 2050    Rhythm: Sinus  Ventricular Rate: 37  QRS Axis: 94  Ectopy: None  Conduction: Second-degree AV block with 2-1 conduction with right bundle branch block  ST Segments: normal  T Waves: Diffusely flattened  Q Waves: None noted    Other findings: None    Compared to EKG on: None to compare    Clinical Impression: Second-degree AV block with 2-1 conduction with right bundle branch block and diffusely flattened T waves. There is no old EKG to compare. Oli Manzanares, DO      RADIOLOGY/PROCEDURES  I personally reviewed the images for this case. XR CHEST PORTABLE   Final Result   Small right-sided pleural effusion. Mild area of patchy opacification at the right lower lung which could   represent atelectasis versus airspace disease process. Mild cardiomegaly. COURSE & MEDICAL DECISION MAKING  Pertinent Labs, EKG, & Imaging studies reviewed. (See chart for details)    Vitals:    06/04/21 2213 06/04/21 2229 06/04/21 2243 06/04/21 2314   BP: (!) 165/61 (!) 162/62 (!) 166/69 (!) 163/67   Pulse: (!) 35 (!) 32 (!) 33 (!) 34   Resp: 15 13 13 14   Temp:       SpO2: 94% 94% 94% 95%         Medications   nitroGLYCERIN 50 mg in dextrose 5% 250 mL infusion (10 mcg/min Intravenous Rate/Dose Change 6/4/21 2256)   aspirin chewable tablet 324 mg (324 mg Oral Given 6/4/21 2112)       New Prescriptions    No medications on file       Patient remained stable in the ED. troponin was negative. His blood work was normal.  Patient had heart rates down into the 20s while he was here. I spoke to Dr. Ashley Rocha immediately after seeing the patient and he stated that patient did not need to be on a transcutaneous pacemaker at this time. He states that he should have his blood pressure decreased with nitrates. After his laboratory work was returned. His EKG showed a second-degree AV block. It is undetermined whether this is a Mobitz 1 or Mobitz 2. Therefore, patient was admitted to the hospital for further evaluation treatment. Hospitalist was notified at [phone]. Patient status did not change during his emergency department stay.     The patient's blood pressure was found to be elevated according to CMS/Medicare and the Affordable Care Act/ObamaCare criteria. Elevated blood pressure could occur because of pain or anxiety or other reasons and does not mean that they need to have their blood pressure treated or medications otherwise adjusted. However, this could also be a sign that they will need to have their blood pressure treated or medications changed. The patient was instructed to follow up closely with their personal physician to have their blood pressure rechecked. The patient was instructed to take a list of recent blood pressure readings to their next visit with their personal physician. I reviewed old records     (This chart has been completed using [address]. Although attempts have been made to ensure accuracy, words and/or phrases may not be transcribed as intended.)    Patient refused pain medicines at the time of their exam.    IMPRESSION(S):  1. Second degree AV block    2. Hypertension, unspecified type    3. Dyspnea and respiratory abnormalities        ? Recheck Times: 2100, 2130, 2205. Critical Care Time: 35 minutes not including billable procedures    Diagnostic considerations include but are not limited to:  myocardial infarction, pulmonary embolus, pneumothorax, pneumonia, aortic dissection, empyema, musculoskeletal chest pain, pulmonary contusion, pericardial effusion, pericarditis, and referred abdominal pain.          Edelmira Etienne, DO  06/04/21 [address] [place] [address], DO  06/11/21 [facility]

## 2021-06-05 NOTE — CONSULTS
[facility]  Cardiology Consult    Kirstin Carson  1955    June 5, 2021      Reason for Referral: Bradycardia, HTN    CC: MORALEZ      Subjective:     History of Present Illness:    Kirstin Carson is a 77 y.o. patient with a PMH significant for hypertension, diabetes, hyperlipidemia presented with complaints of shortness of breath. Shortness of breath started for past 2 to 4 days. No chest pain palpitation diaphoresis. Complains of weakness associated with it. Shortness of breath with exertion. Shortness of breath relieved at rest.  No nausea vomiting diaphoresis. Patient checked his blood pressure was extremely elevated. He also noted he had bradycardia. He felt fatigued and tired. No passing out no syncope no dizziness. Past Medical History:   has a past medical history of Diabetes mellitus ([address] Hepatitis C, Hyperlipidemia, Hypertension, Kidney damage, and Neuropathy. Surgical History:   has a past surgical history that includes Abdominal aortic aneurysm repair (2019); Colonoscopy; Intracapsular cataract extraction (Left, 09/15/2020); Intracapsular cataract extraction (Right, 09/29/2020); back surgery; back surgery (2016); and Tonsillectomy. Social History:   reports that he has quit smoking. His smoking use included cigarettes. He has never used smokeless tobacco. He reports current alcohol use. He reports that he does not use drugs. Family History:  family history includes Cancer in his father and mother. Home Medications:  Were reviewed and are listed in nursing record and/or below  Prior to Admission medications    Medication Sig Start Date End Date Taking?  Authorizing Provider   lisinopril (PRINIVIL;ZESTRIL) 40 MG tablet Take 40 mg by mouth daily 12/21/20  Yes Historical Provider, MD   naproxen (NAPROSYN) 250 MG tablet Take 250 mg by mouth daily as needed for Pain (Arthritis)   Yes Historical Provider, MD   metFORMIN (GLUCOPHAGE) 500 MG tablet Take 500 mg by mouth 2 times daily (with meals)   Yes Historical Provider, MD   gabapentin (NEURONTIN) 600 MG tablet Take 900 mg by mouth daily. Yes Historical Provider, MD   atorvastatin (LIPITOR) 80 MG tablet Take 80 mg by mouth every evening    Yes Historical Provider, MD        CURRENT Medications:  atorvastatin (LIPITOR) tablet 80 mg, QPM  gabapentin (NEURONTIN) capsule 900 mg, Daily  lisinopril (PRINIVIL;ZESTRIL) tablet 40 mg, Daily  insulin lispro (HUMALOG) injection vial 0-6 Units, TID WC  insulin lispro (HUMALOG) injection vial 0-3 Units, Nightly  glucose (GLUTOSE) 40 % oral gel 15 g, PRN  dextrose 50 % IV solution, PRN  glucagon (rDNA) injection 1 mg, PRN  dextrose 5 % solution, PRN  sodium chloride flush 0.9 % injection 5-40 mL, 2 times per day  sodium chloride flush 0.9 % injection 5-40 mL, PRN  0.9 % sodium chloride infusion, PRN  enoxaparin (LOVENOX) injection 40 mg, Daily  ondansetron (ZOFRAN-ODT) disintegrating tablet 4 mg, Q8H PRN   Or  ondansetron (ZOFRAN) injection 4 mg, Q6H PRN  acetaminophen (TYLENOL) tablet 650 mg, Q6H PRN   Or  acetaminophen (TYLENOL) suppository 650 mg, Q6H PRN  perflutren lipid microspheres (DEFINITY) injection 1.65 mg, ONCE PRN  nitroGLYCERIN 50 mg in dextrose 5% 250 mL infusion, Continuous  hydrALAZINE (APRESOLINE) injection 5 mg, Q6H PRN  ipratropium-albuterol (DUONEB) nebulizer solution 1 ampule, Q8H  amLODIPine (NORVASC) tablet 5 mg, Daily        Allergies:  Patient has no known allergies. Review of Systems: SEE HPI   · Constitutional: no unanticipated weight loss. There's been no change in energy level, sleep pattern, or activity level. No fevers, chills. · Eyes: No visual changes or diplopia. No scleral icterus. · ENT: No Headaches, hearing loss or vertigo. No mouth sores or sore throat. · Cardiovascular: No Chest pain, tightness or discomfort.   Shortness of breath.    No Dyspnea on exertion, Orthopnea, Paroxysmal nocturnal dyspnea or breathlessness at rest.   No cyanosis or edema. No cyanosis clubbing       Skin: Inspection and palpation performed, no rashes or lesions. Pysch: Normal mood and affect. Alert and oriented to time place person   Neurologic: Normal gross motor and sensory exam.       Labs     All labs have been reviewed    Lab Results   Component Value Date    WBC 6.3 06/05/2021    RBC 3.80 06/05/2021    HGB 12.1 06/05/2021    HCT 35.1 06/05/2021    MCV 92.3 06/05/2021    RDW 13.0 06/05/2021     06/05/2021     Lab Results   Component Value Date     06/05/2021    K 4.8 06/05/2021    K 4.1 06/04/2021     06/05/2021    CO2 24 06/05/2021    BUN 30 06/05/2021    CREATININE 1.7 06/05/2021    GFRAA 49 06/05/2021    AGRATIO 1.9 06/04/2021    LABGLOM 40 06/05/2021    GLUCOSE 103 06/05/2021    PROT 6.1 06/04/2021    CALCIUM 9.1 06/05/2021    BILITOT 0.6 06/04/2021    ALKPHOS 90 06/04/2021    AST 17 06/04/2021    ALT 9 06/04/2021     No results found for: PTINR  No results found for: LABA1C  Lab Results   Component Value Date    TROPONINI <0.01 06/05/2021       Cardiac, Vascular and Imaging Data all Personally Reviewed in Detail by Myself      EKG: Normal sinus rhythm with 2-1 AV block    Echocardiogram: Pending    Stress Test:     Cath: Other imaging:     Assessment and Plan     hypertensive Emergency   -Acute Diastolic heart failure secondary to uncontrolled HTN  Start diuretics    -Start aggressive BP control    Get echocardiogram     Add amlodipine 5 mg daily. Add Lasix 20 mg daily. We will get CTA abdomen pelvis to rule out renal artery stenosis. Stop intravenous nitroglycerin. Thank you for allowing us to participate in the care of [facility]. Please do not hesitate to contact me if you have any questions.     Evette Nettles MD, MPH    [facility], [address] [facility]  Ph: [phone]  Fax: [phone]    6/5/2021 10:54 AM

## 2021-06-05 NOTE — CONSULTS
REASON FOR CONSULTATION/CC: Bradycardia with bilateral pleural effusions      Consult at request of June Sanders DO for bradycardia with bilateral pleural effusions  PCP: Lyn Veloz  Established Pulmonologist: *None    HISTORY OF PRESENT ILLNESS: Ulisses Stovall is a 77y.o. year old male with a history of   who presents with      He presented to the emergency room with with shortness of breath and bradycardia. He had his blood pressure checked at [address] noting increased blood pressure with bradycardia      Assessment:        Mobitz type II heart block with severe bradycardia  Bilateral pleural effusions  Abnormal radiograph / pulmonary nodule  Mediastinal calcifications  Acute hypoxemic respiratory failure  Hypertension  History of abdominal aneurysm status post repair  History of renal stent with failure of one kidney    Grade 1 diastolic dysfunction echo 2019  Emphysema PI MM  Diabetes mellitus  Quit tobacco 10 years ago. E-cigarette use for last 10 years. Plan:      Hospital Day 0     Abnormal radiograph  -Chest x-ray 2013 demonstrated changes in the mediastinum which was concerning for interstitial edema versus interstitial lung disease. This was completed outside of [facility] and therefore direct visualization not able to be completed. Abdominal CT 2018 with dictated clear lung bases. CT neck 2021 demonstrates apical fibrotic changes and centrilobular emphysema.  -Infection less likely. Check procalcitonin.  -Patient may have pulmonary nodule. Outpatient PET scan versus CT follow-up indicated. Bilateral pleural effusion  -May be secondary to bradycardia and diastolic dysfunction.   -Optimize cardiac status and follow chest x-ray.  -No thoracentesis for now. Emphysema  -Patient denies being told of emphysema however, alpha-1 antitrypsin was assessed years ago.   Therefore, it is likely this was discussed with the patient.  -Not on outpatient medication and denies significant wheezing coughing phlegm.  -Shortness of breath may be secondary to bradycardia. Follow symptoms as cardiology optimize his cardiac status. Acute kidney injury on CKD  -Last creatinine 1.  2  -Monitor with cardiac optimization. Hypertension  -Nitroglycerin drip. Defer to cardiology. This note was transcribed using [id number] [facility]. Please disregard any translational errors. Thank you for the consult    Natashaemilio Arriola [facility]  [phone]             Data:     PAST MEDICAL HISTORY:  Past Medical History:   Diagnosis Date    Diabetes mellitus ([address]     Hepatitis C     was treated     Hyperlipidemia     Hypertension     Kidney damage     Neuropathy     feet       PAST SURGICAL HISTORY:  Past Surgical History:   Procedure Laterality Date    ABDOMINAL AORTIC ANEURYSM REPAIR  2019    BACK SURGERY      pinched nerve     BACK SURGERY  2016    COLONOSCOPY      INTRACAPSULAR CATARACT EXTRACTION Left 09/15/2020    LEFT EYE Phacoemulsification with intraocular lens implant performed by Stephan Crowe MD at [place] [id number] Right 09/29/2020    RIGHT EYE Phacoemulsification with intraocular lens implant performed by Stephan Crowe MD at [address] [place] [address]      removed as a kid       FAMILY HISTORY:  family history includes Cancer in his father and mother. SOCIAL HISTORY:   reports that he has quit smoking. His smoking use included cigarettes.  He has never used smokeless tobacco.    Scheduled Meds:   atorvastatin  80 mg Oral QPM    gabapentin  900 mg Oral Daily    lisinopril  40 mg Oral Daily    insulin lispro  0-6 Units Subcutaneous TID WC    insulin lispro  0-3 Units Subcutaneous Nightly    sodium chloride flush  5-40 mL Intravenous 2 times per day    enoxaparin  40 mg Subcutaneous Daily       Continuous Infusions:   dextrose      sodium chloride      nitroGLYCERIN 80 mcg/min 06/04/21 2129   AST 17   ALT 9*   BILITOT 0.6   ALKPHOS 90     PT/INR: No results for input(s): PROTIME, INR in the last 72 hours. APTT: No results for input(s): APTT in the last 72 hours. UA:No results for input(s): NITRITE, COLORU, PHUR, LABCAST, WBCUA, RBCUA, MUCUS, TRICHOMONAS, YEAST, BACTERIA, CLARITYU, SPECGRAV, LEUKOCYTESUR, UROBILINOGEN, BILIRUBINUR, BLOODU, GLUCOSEU, AMORPHOUS in the last 72 hours. Invalid input(s): KETONESU  No results for input(s): PHART, XRQ3ZXQ, PO2ART in the last 72 hours. Vent Information  SpO2: 95 %    Radiology Review:  Pertinent images / reports were reviewed as a part of this visit. CT Chest w/ contrast: No results found for this or any previous visit. CT Chest w/o contrast: Results for orders placed during the hospital encounter of 06/04/21    CT CHEST WO CONTRAST    Narrative  EXAMINATION:  CT OF THE CHEST WITHOUT CONTRAST 6/5/2021 12:51 am    TECHNIQUE:  CT of the chest was performed without the administration of intravenous  contrast. Multiplanar reformatted images are provided for review. Dose  modulation, iterative reconstruction, and/or weight based adjustment of the  mA/kV was utilized to reduce the radiation dose to as low as reasonably  achievable. COMPARISON:  None. HISTORY:  ORDERING SYSTEM PROVIDED HISTORY: SOB  TECHNOLOGIST PROVIDED HISTORY:  Reason for exam:->SOB  Decision Support Exception - unselect if not a suspected or confirmed  emergency medical condition->Emergency Medical Condition (MA)  Reason for Exam: sob    FINDINGS:  Mediastinum: Heart is borderline enlarged. No pericardial effusion. Aorta  is within normal limits in size. Pulmonary arteries are enlarged. . Coronary  artery calcifications noted. Lungs/pleura: Central airways are patent. Diffuse interlobular septal  thickening. 0.7 cm lingular nodule. Scattered granulomas. Centrilobular  emphysema. Irregular right lower lobe nodule measuring 1.6 cm.   Masslike  right atelectasis versus airspace disease  process. Mild cardiomegaly with central pulmonary venous congestion. Visualized osseous structures are unremarkable. Impression  Small right-sided pleural effusion. Mild area of patchy opacification at the right lower lung which could  represent atelectasis versus airspace disease process. Mild cardiomegaly.

## 2021-06-06 VITALS
OXYGEN SATURATION: 96 % | HEART RATE: 44 BPM | DIASTOLIC BLOOD PRESSURE: 67 MMHG | HEIGHT: 67 IN | WEIGHT: 167.99 LBS | BODY MASS INDEX: 26.37 KG/M2 | TEMPERATURE: 97.6 F | RESPIRATION RATE: 16 BRPM | SYSTOLIC BLOOD PRESSURE: 173 MMHG

## 2021-06-06 LAB
ANION GAP SERPL CALCULATED.3IONS-SCNC: 12 MMOL/L (ref 3–16)
BASOPHILS ABSOLUTE: 0 K/UL (ref 0–0.2)
BASOPHILS RELATIVE PERCENT: 0.6 %
BUN BLDV-MCNC: 32 MG/DL (ref 7–20)
CALCIUM SERPL-MCNC: 9.8 MG/DL (ref 8.3–10.6)
CHLORIDE BLD-SCNC: 103 MMOL/L (ref 99–110)
CO2: 26 MMOL/L (ref 21–32)
CREAT SERPL-MCNC: 1.5 MG/DL (ref 0.8–1.3)
EKG ATRIAL RATE: 38 BPM
EKG DIAGNOSIS: NORMAL
EKG P AXIS: 69 DEGREES
EKG P-R INTERVAL: 176 MS
EKG Q-T INTERVAL: 726 MS
EKG QRS DURATION: 158 MS
EKG QTC CALCULATION (BAZETT): 577 MS
EKG R AXIS: 97 DEGREES
EKG T AXIS: 62 DEGREES
EKG VENTRICULAR RATE: 38 BPM
EOSINOPHILS ABSOLUTE: 0.2 K/UL (ref 0–0.6)
EOSINOPHILS RELATIVE PERCENT: 2.1 %
GFR AFRICAN AMERICAN: 56
GFR NON-AFRICAN AMERICAN: 47
GLUCOSE BLD-MCNC: 114 MG/DL (ref 70–99)
GLUCOSE BLD-MCNC: 120 MG/DL (ref 70–99)
HCT VFR BLD CALC: 39.5 % (ref 40.5–52.5)
HEMOGLOBIN: 13.8 G/DL (ref 13.5–17.5)
LYMPHOCYTES ABSOLUTE: 1.1 K/UL (ref 1–5.1)
LYMPHOCYTES RELATIVE PERCENT: 14.5 %
MAGNESIUM: 2.2 MG/DL (ref 1.8–2.4)
MCH RBC QN AUTO: 31.8 PG (ref 26–34)
MCHC RBC AUTO-ENTMCNC: 35 G/DL (ref 31–36)
MCV RBC AUTO: 90.8 FL (ref 80–100)
MONOCYTES ABSOLUTE: 0.7 K/UL (ref 0–1.3)
MONOCYTES RELATIVE PERCENT: 9.4 %
NEUTROPHILS ABSOLUTE: 5.6 K/UL (ref 1.7–7.7)
NEUTROPHILS RELATIVE PERCENT: 73.4 %
PDW BLD-RTO: 13 % (ref 12.4–15.4)
PERFORMED ON: ABNORMAL
PLATELET # BLD: 154 K/UL (ref 135–450)
PMV BLD AUTO: 9.1 FL (ref 5–10.5)
POTASSIUM SERPL-SCNC: 4.2 MMOL/L (ref 3.5–5.1)
RBC # BLD: 4.34 M/UL (ref 4.2–5.9)
SODIUM BLD-SCNC: 141 MMOL/L (ref 136–145)
WBC # BLD: 7.6 K/UL (ref 4–11)

## 2021-06-06 PROCEDURE — 99233 SBSQ HOSP IP/OBS HIGH 50: CPT | Performed by: INTERNAL MEDICINE

## 2021-06-06 PROCEDURE — 2580000003 HC RX 258: Performed by: STUDENT IN AN ORGANIZED HEALTH CARE EDUCATION/TRAINING PROGRAM

## 2021-06-06 PROCEDURE — 6360000002 HC RX W HCPCS: Performed by: STUDENT IN AN ORGANIZED HEALTH CARE EDUCATION/TRAINING PROGRAM

## 2021-06-06 PROCEDURE — 85025 COMPLETE CBC W/AUTO DIFF WBC: CPT

## 2021-06-06 PROCEDURE — 93005 ELECTROCARDIOGRAM TRACING: CPT | Performed by: STUDENT IN AN ORGANIZED HEALTH CARE EDUCATION/TRAINING PROGRAM

## 2021-06-06 PROCEDURE — 6370000000 HC RX 637 (ALT 250 FOR IP): Performed by: STUDENT IN AN ORGANIZED HEALTH CARE EDUCATION/TRAINING PROGRAM

## 2021-06-06 PROCEDURE — 83735 ASSAY OF MAGNESIUM: CPT

## 2021-06-06 PROCEDURE — 93010 ELECTROCARDIOGRAM REPORT: CPT | Performed by: INTERNAL MEDICINE

## 2021-06-06 PROCEDURE — 80048 BASIC METABOLIC PNL TOTAL CA: CPT

## 2021-06-06 PROCEDURE — 36415 COLL VENOUS BLD VENIPUNCTURE: CPT

## 2021-06-06 PROCEDURE — 94760 N-INVAS EAR/PLS OXIMETRY 1: CPT

## 2021-06-06 PROCEDURE — 6370000000 HC RX 637 (ALT 250 FOR IP): Performed by: INTERNAL MEDICINE

## 2021-06-06 RX ORDER — HYDRALAZINE HYDROCHLORIDE 25 MG/1
25 TABLET, FILM COATED ORAL EVERY 8 HOURS SCHEDULED
Qty: 90 TABLET | Refills: 0 | Status: SHIPPED | OUTPATIENT
Start: 2021-06-06 | End: 2021-07-06 | Stop reason: SDUPTHER

## 2021-06-06 RX ORDER — AMLODIPINE BESYLATE 10 MG/1
10 TABLET ORAL DAILY
Qty: 30 TABLET | Refills: 0 | Status: SHIPPED | OUTPATIENT
Start: 2021-06-07 | End: 2021-07-06 | Stop reason: SDUPTHER

## 2021-06-06 RX ADMIN — Medication 10 ML: at 03:11

## 2021-06-06 RX ADMIN — HYDRALAZINE HYDROCHLORIDE 5 MG: 20 INJECTION INTRAMUSCULAR; INTRAVENOUS at 03:11

## 2021-06-06 RX ADMIN — ENOXAPARIN SODIUM 40 MG: 40 INJECTION SUBCUTANEOUS at 09:23

## 2021-06-06 RX ADMIN — AMLODIPINE BESYLATE 10 MG: 10 TABLET ORAL at 09:20

## 2021-06-06 RX ADMIN — HYDRALAZINE HYDROCHLORIDE 25 MG: 25 TABLET, FILM COATED ORAL at 06:38

## 2021-06-06 RX ADMIN — LISINOPRIL 40 MG: 40 TABLET ORAL at 09:20

## 2021-06-06 RX ADMIN — GABAPENTIN 900 MG: 300 CAPSULE ORAL at 09:21

## 2021-06-06 ASSESSMENT — PAIN SCALES - GENERAL
PAINLEVEL_OUTOF10: 0

## 2021-06-06 NOTE — PROGRESS NOTES
Late entry due to patient care. 06/05/2021    @ [address] hand-off done w/ off-going CARROLL Hartmann.    @ 2115 - Shift assessment completed. He's alert and oriented (x4), SB (HR 38 bpm) w/ 2nd degree AV block (Mobitz I) on the monitor, on room air, not in any distress, and denies any pain. 06/06/2021    @ 0030 - Was asleep, but easily arousable w/ verbal stimulation. Reassessment unchanged, except he's back to SB w/ 2nd degree AV Block (Mobitz II). @ 0311 - BP = 182/53. PRN Hydralazine 5 mg slow IV push was given (see MAR). @ 0330 - Reassessment unchanged. He still denies any pain nor any SOB.     Electronically signed by Latrell Walton RN on 6/6/2021 at 4:52 AM

## 2021-06-06 NOTE — PROGRESS NOTES
daily (with meals)   Yes Historical Provider, MD   gabapentin (NEURONTIN) 600 MG tablet Take 900 mg by mouth daily. Yes Historical Provider, MD   atorvastatin (LIPITOR) 80 MG tablet Take 80 mg by mouth every evening    Yes Historical Provider, MD        CURRENT Medications:  atorvastatin (LIPITOR) tablet 80 mg, QPM  gabapentin (NEURONTIN) capsule 900 mg, Daily  lisinopril (PRINIVIL;ZESTRIL) tablet 40 mg, Daily  insulin lispro (HUMALOG) injection vial 0-6 Units, TID WC  insulin lispro (HUMALOG) injection vial 0-3 Units, Nightly  glucose (GLUTOSE) 40 % oral gel 15 g, PRN  dextrose 50 % IV solution, PRN  glucagon (rDNA) injection 1 mg, PRN  dextrose 5 % solution, PRN  sodium chloride flush 0.9 % injection 5-40 mL, 2 times per day  sodium chloride flush 0.9 % injection 5-40 mL, PRN  0.9 % sodium chloride infusion, PRN  enoxaparin (LOVENOX) injection 40 mg, Daily  ondansetron (ZOFRAN-ODT) disintegrating tablet 4 mg, Q8H PRN   Or  ondansetron (ZOFRAN) injection 4 mg, Q6H PRN  acetaminophen (TYLENOL) tablet 650 mg, Q6H PRN   Or  acetaminophen (TYLENOL) suppository 650 mg, Q6H PRN  perflutren lipid microspheres (DEFINITY) injection 1.65 mg, ONCE PRN  hydrALAZINE (APRESOLINE) injection 5 mg, Q6H PRN  ipratropium-albuterol (DUONEB) nebulizer solution 1 ampule, Q8H  amLODIPine (NORVASC) tablet 10 mg, Daily  hydrALAZINE (APRESOLINE) tablet 25 mg, 3 times per day        Allergies:  Patient has no known allergies. Review of Systems: SEE HPI   · Constitutional: no unanticipated weight loss. There's been no change in energy level, sleep pattern, or activity level. No fevers, chills. · Eyes: No visual changes or diplopia. No scleral icterus. · ENT: No Headaches, hearing loss or vertigo. No mouth sores or sore throat. · Cardiovascular: No Chest pain, tightness or discomfort.   Shortness of breath. No Dyspnea on exertion, Orthopnea, Paroxysmal nocturnal dyspnea or breathlessness at rest.   No Palpitations.    No Syncope ('blackouts', 'faints', 'collapse') or dizziness. · Respiratory: No cough or wheezing, no sputum production. No hematemesis. · Gastrointestinal: No abdominal pain, appetite loss, blood in stools. No change in bowel or bladder habits. · Genitourinary: No dysuria, trouble voiding, or hematuria. · Musculoskeletal:  No gait disturbance, no joint complaints. · Integumentary: No rash or pruritis. · Neurological: No headache, diplopia, change in muscle strength, numbness or tingling. · Psychiatric: No anxiety or depression. · Endocrine: No temperature intolerance. No excessive thirst, fluid intake, or urination. No tremor. · Hematologic/Lymphatic: No abnormal bruising or bleeding, blood clots or swollen lymph nodes. · Allergic/Immunologic: No nasal congestion or hives. Objective:     PHYSICAL EXAM:      Vitals:    06/06/21 0600   BP: (!) 169/70   Pulse: (!) 36   Resp: 16   Temp:    SpO2: 91%    Weight: 167 lb 15.9 oz (76.2 kg)       General Appearance:  Alert, cooperative, no distress, appears stated age. Head:  Normocephalic, without obvious abnormality, atraumatic. Eyes:  Pupils equal and round. No scleral icterus. Mouth: Moist mucosa, no pharyngeal erythema. Nose: Nares normal. No drainage or sinus tenderness. Neck: Supple, symmetrical, trachea midline. No adenopathy. No tenderness/mass/nodules. No carotid bruit or elevated JVD. Lungs:   Respiratory Effort: Normal   Auscultation: Clear to auscultation bilaterally, respirations unlabored. No wheeze, rales   Chest Wall:  No tenderness or deformity. Cardiovascular:    Pulses  Palpation: normal   Ascultation: Regular rate, S1/ S2 normal. No murmur, rub, or gallop. 2+ radial and pedal pulses, symmetric  Carotid  Femoral   Abdomen and Gastrointestinal:   Soft, non-tender, bowel sounds active. Liver and Spleen  Masses   Musculoskeletal: No muscle wasting  Back  Gait   Extremities: Extremities normal, atraumatic. No cyanosis or edema. No cyanosis clubbing       Skin: Inspection and palpation performed, no rashes or lesions. Pysch: Normal mood and affect. Alert and oriented to time place person   Neurologic: Normal gross motor and sensory exam.       Labs     All labs have been reviewed    Lab Results   Component Value Date    WBC 7.6 06/06/2021    RBC 4.34 06/06/2021    HGB 13.8 06/06/2021    HCT 39.5 06/06/2021    MCV 90.8 06/06/2021    RDW 13.0 06/06/2021     06/06/2021     Lab Results   Component Value Date     06/06/2021    K 4.2 06/06/2021    K 4.1 06/04/2021     06/06/2021    CO2 26 06/06/2021    BUN 32 06/06/2021    CREATININE 1.5 06/06/2021    GFRAA 56 06/06/2021    AGRATIO 1.9 06/04/2021    LABGLOM 47 06/06/2021    GLUCOSE 114 06/06/2021    PROT 6.1 06/04/2021    CALCIUM 9.8 06/06/2021    BILITOT 0.6 06/04/2021    ALKPHOS 90 06/04/2021    AST 17 06/04/2021    ALT 9 06/04/2021     No results found for: PTINR  No results found for: LABA1C  Lab Results   Component Value Date    TROPONINI <0.01 06/05/2021       Cardiac, Vascular and Imaging Data all Personally Reviewed in Detail by Myself      EKG: Normal sinus rhythm with 2-1 AV block    Echocardiogram: There is mild concentric left ventricular hypertrophy. Ejection fraction is visually estimated to be 55-60%. No regional wall motion abnormalities are noted. Grade II diastolic dysfunction with elevated LV filling pressures. Calcification of the mitral valve noted. Trivial mitral regurgitation. No evidence of mitral stenosis. The left atrium is dilated. mild- aortic stenosis with a mean pressure gradient of 16mmHg. Trivial aortic regurgitation. Mildly dilated right ventricle. Right ventricular systolic function is normal .         Stress Test:     Cath: Other imaging:     Assessment and Plan     hypertensive Emergency   -Acute Diastolic heart failure secondary to uncontrolled HTN    Right renal artery occluded. Ischemic nephropathy.   This is probably causing hypertensive crisis. -Start aggressive BP control        Added amlodipine, hydralazine. Follow blood pressure. Discharged on amlodipine, hydralazine, lisinopril. May benefit from spironolactone which we will entertain as outpatient setting. Follow-up with me in 2 to 3 weeks    Thank you for allowing us to participate in the care of [facility]. Please do not hesitate to contact me if you have any questions.     Laura Curtis MD, MPH    [facility] [address]  Ph: [phone]  Fax: [phone]    6/6/2021 8:49 AM

## 2021-06-06 NOTE — PROGRESS NOTES
Late entry due to patient care. 12 - Received report from previous RN. No further questions. 0900 - Dr. Ordonez Sis at bedside. States patient needs to ambulate, if tolerates, can be discharged. 1000 - Pt ambulated in [place] without issue. Discharge order placed. 1100 - Pt confirmed d/c paperwork has correct name. Discharge and education instructions reviewed with patient. Teach-back successful. Pt verbalized understanding and signed d/c papers. Pt denied questions at this time. No acute distress noted. Patient instructed to follow-up as noted - return to emergency department if symptoms worsen. Patient verbalized understanding. . Pt discharged to private vehicle with CVU RN. Patient stable upon departure. Thanked patient for choosing [facility]) for care.

## 2021-06-06 NOTE — PROGRESS NOTES
Pulmonary Progress Note    Date of Admission: 6/4/2021   LOS: 1 day       CC:  shortness of breath     Subjective:  Feeling better, weaned to room air   Would like to d/c     ROS:   No nausea  No Vomiting  No chest pain       Assessment:     Mobitz type II heart block with severe bradycardia  Bilateral pleural effusions  Abnormal radiograph / pulmonary nodule  Mediastinal calcifications  Acute hypoxemic respiratory failure  Hypertension  History of abdominal aneurysm status post repair  History of renal stent with failure of one kidney    Grade 1 diastolic dysfunction echo 2019  Emphysema PI MM  Diabetes mellitus  Quit tobacco 10 years ago. E-cigarette use for last 10 years. Plan:        Hospital Day: 1     Abnormal radiograph  -Chest x-ray 2013 demonstrated changes in the mediastinum which was concerning for interstitial edema versus interstitial lung disease. This was completed outside of [facility] and therefore direct visualization not able to be completed. Abdominal CT 2018 with dictated clear lung bases. CT neck 2021 demonstrates apical fibrotic changes and centrilobular emphysema.  -Infection less likely. Check procalcitonin.  -Patient may have pulmonary nodule. Outpatient PET scan versus CT follow-up indicated. -Outpatient follow-up given  Future Appointments   Date Time Provider Grace Odonnell   7/22/2021  7:40 AM Maria Fernanda Kwan MD [address] [place] [address] PULM MMA            Bilateral pleural effusion  -Started on Lasix.  -2 L diuresis  -Chest x-ray ordered        Emphysema  -Patient denies being told of emphysema however, alpha-1 antitrypsin was assessed years ago.   Therefore, it is likely this was discussed with the patient.  -Shortness of breath improving with cardiac optimization  -DuoNebs 3 times daily     Acute kidney injury on CKD  -Last creatinine 1.  2  -Downtrending with Lasix     Hypertension  -Per cardiology         Data:        PHYSICAL EXAM:   Blood pressure (!) 169/70, pulse (!) 36, temperature 97.8 °F (36.6 °C), temperature source Oral, resp. rate 16, height 5' 7\" (1.702 m), weight 167 lb 15.9 oz (76.2 kg), SpO2 91 %.'  Body mass index is 26.31 kg/m². Gen: No distress. ENT:   Resp: No accessory muscle use. No crackles. No wheezes. No rhonchi. CV: Regular rate. Regular rhythm. No murmur or rub. No edema. Skin: Warm, dry, normal texture and turgor. No nodule on exposed extremities. M/S: No cyanosis. No clubbing. No joint deformity. Psych: Oriented x 3. No anxiety. Awake. Alert. Intact judgement and insight. Good Mood / Affect. Memory appears in tact       Medications:    Scheduled Meds:   atorvastatin  80 mg Oral QPM    gabapentin  900 mg Oral Daily    lisinopril  40 mg Oral Daily    insulin lispro  0-6 Units Subcutaneous TID WC    insulin lispro  0-3 Units Subcutaneous Nightly    sodium chloride flush  5-40 mL Intravenous 2 times per day    enoxaparin  40 mg Subcutaneous Daily    ipratropium-albuterol  1 ampule Inhalation Q8H    amLODIPine  10 mg Oral Daily    hydrALAZINE  25 mg Oral 3 times per day       Continuous Infusions:   dextrose      sodium chloride         PRN Meds:  glucose, dextrose, glucagon (rDNA), dextrose, sodium chloride flush, sodium chloride, ondansetron **OR** ondansetron, acetaminophen **OR** acetaminophen, perflutren lipid microspheres, hydrALAZINE    Labs reviewed:  CBC:   Recent Labs     06/04/21 2129 06/05/21 0248 06/06/21  0440   WBC 6.7 6.3 7.6   HGB 12.4* 12.1* 13.8   HCT 35.9* 35.1* 39.5*   MCV 91.7 92.3 90.8    139 154     BMP:   Recent Labs     06/04/21 2129 06/05/21 0248 06/06/21  0440    142 141   K 4.1 4.8 4.2    108 103   CO2 24 24 26   BUN 29* 30* 32*   CREATININE 1.9* 1.7* 1.5*     LIVER PROFILE:   Recent Labs     06/04/21 2129   AST 17   ALT 9*   BILITOT 0.6   ALKPHOS 90     PT/INR: No results for input(s): PROTIME, INR in the last 72 hours. APTT: No results for input(s): APTT in the last 72 hours.   UA:No results for input(s): NITRITE, COLORU, PHUR, LABCAST, WBCUA, RBCUA, MUCUS, TRICHOMONAS, YEAST, BACTERIA, CLARITYU, SPECGRAV, LEUKOCYTESUR, UROBILINOGEN, BILIRUBINUR, BLOODU, GLUCOSEU, AMORPHOUS in the last 72 hours. Invalid input(s): Alexia Espinoza  No results for input(s): PH, PCO2, PO2 in the last 72 hours. Cx:      Films: This note was transcribed using [id number]. Please disregard any translational errors.       Teresa Arriola Pulmonary, [facility] and [facility] [phone]

## 2021-06-06 NOTE — PROGRESS NOTES
Hospitalist Progress Note      PCP: Bora Sarabia    Date of Admission: 6/4/2021      Subjective: feels ok, no chest pain, no shortness of breath no cough. Denies any fever or chills      Medications:  Reviewed    Infusion Medications    dextrose      sodium chloride       Scheduled Medications    atorvastatin  80 mg Oral QPM    gabapentin  900 mg Oral Daily    lisinopril  40 mg Oral Daily    insulin lispro  0-6 Units Subcutaneous TID WC    insulin lispro  0-3 Units Subcutaneous Nightly    sodium chloride flush  5-40 mL Intravenous 2 times per day    enoxaparin  40 mg Subcutaneous Daily    ipratropium-albuterol  1 ampule Inhalation Q8H    amLODIPine  10 mg Oral Daily    hydrALAZINE  25 mg Oral 3 times per day     PRN Meds: glucose, dextrose, glucagon (rDNA), dextrose, sodium chloride flush, sodium chloride, ondansetron **OR** ondansetron, acetaminophen **OR** acetaminophen, perflutren lipid microspheres, hydrALAZINE      Intake/Output Summary (Last 24 hours) at 6/6/2021 0655  Last data filed at 6/6/2021 0654  Gross per 24 hour   Intake 690 ml   Output 3050 ml   Net -2360 ml       Physical Exam Performed:    BP (!) 169/70   Pulse (!) 36   Temp 97.8 °F (36.6 °C) (Oral)   Resp 16   Ht 5' 7\" (1.702 m)   Wt 167 lb 15.9 oz (76.2 kg)   SpO2 91%   BMI 26.31 kg/m²     General appearance: No apparent distress  Neck: Supple  Respiratory:  Normal respiratory effort. Clear to auscultation, bilaterally without Rales/Wheezes/Rhonchi. Cardiovascular: Bradycardia  Abdomen: Soft, non-tender, non-distended   Musculoskeletal: No clubbing, cyanosis  Skin: Skin color, texture, turgor normal.  No rashes or lesions.   Neurologic:  No focal weakness   Psychiatric: Alert and oriented  Capillary Refill: Brisk,3 seconds, normal   Peripheral Pulses: +2 palpable, equal bilaterally       Labs:   Recent Labs     06/04/21  2129 06/05/21  0248 06/06/21  0440   WBC 6.7 6.3 7.6   HGB 12.4* 12.1* 13.8   HCT 35.9* 35.1* 39.5*  139 154     Recent Labs     06/04/21 2129 06/05/21 0248 06/06/21  0440    142 141   K 4.1 4.8 4.2    108 103   CO2 24 24 26   BUN 29* 30* 32*   CREATININE 1.9* 1.7* 1.5*   CALCIUM 9.0 9.1 9.8     Recent Labs     06/04/21 2129   AST 17   ALT 9*   BILITOT 0.6   ALKPHOS 90     No results for input(s): INR in the last 72 hours. Recent Labs     06/04/21 2129 06/05/21 0248 06/05/21  0740   TROPONINI <0.01 <0.01 <0.01       Urinalysis:    No results found for: Leo Kales, BACTERIA, RBCUA, BLOODU, SPECGRAV, GLUCOSEU    Radiology:  CTA ABDOMEN PELVIS W WO CONTRAST   Final Result   1. The right renal artery appears occluded at the vascular wall stent,   however there is a small amount of renal enhancement. 2. Focal area of renal ischemia inferior pole left kidney. 3. Atrophic right kidney. Punctate, nonobstructing calculus right kidney. No ureter calculus or hydronephrosis. 4. Duplicated left ureters converge at the distal ureter, just proximal to   the left ureterovesical junction. 5. Small area of poor enhancement and focal renal atrophy is demonstrated   medial inferior pole left kidney. 6. About 60% stenosis at the ostium the left internal iliac artery with   poststenotic dilatation. 7. Bilateral pleural effusion and bibasilar consolidation. Pneumonia is a   consideration. 8. Modic type changes from degenerative disc disease L3-4 and grade 1   degenerative anterolisthesis L3 on L4. Status post L3 and L4 laminectomies. 9.  Diverticulosis coli without CT evidence of acute diverticulitis. CT CHEST WO CONTRAST   Final Result   1. Congestive heart failure. 2. Masslike opacity in the right lower lobe. Correlate with presentation to   exclude infection. Follow-up recommended. 3. Pulmonary nodules measuring up to 1.6 cm. Three-month follow-up   recommended. 4. Complex right pleural effusion with pleural thickening. Correlate   clinically to exclude empyema. Follow-up recommended as malignant effusion   is in the differential.   5. Partially visualized infrarenal abdominal aortic aneurysm. CT abdomen   pelvis recommended for complete evaluation. 6. Other findings as described. XR CHEST PORTABLE   Final Result   Small right-sided pleural effusion. Mild area of patchy opacification at the right lower lung which could   represent atelectasis versus airspace disease process. Mild cardiomegaly. XR CHEST PORTABLE    (Results Pending)           Assessment/Plan:    Active Hospital Problems    Diagnosis     Heart block [I45.9]     Hypertensive urgency [I16.0]     ORALIA (acute kidney injury) ([facility] [address] [N17.9]     Generalized weakness [R53.1]     SOB (shortness of breath) [R06.02]     Pleural effusion, right [J90]     DM2 (diabetes mellitus, type 2) ([facility] [address] [E11.9]     Hypertensive emergency [I16.1]     Second degree AV block [I44.1]      1. Second-degree type II AV block, cardiology following  2. Hypertensive emergency, better controlled at this time, cardiology following, p.o. medications added. 3.  Pulmonary mass/nodules, follow-up as outpatient and get PET scan or CT scan. 4.  Bilateral pleural effusion, likely cardiac etiology, follow-up as outpatient with pulmonary for further monitoring and thoracentesis if deemed necessary  5. Acute likely on chronic diastolic heart failure, likely due to uncontrolled hypertension, p.o. Lasix for now  6. Acute renal failure, creatinine 1.5 this am.   7. Renal artery stenosis, with focal area of renal ischemia. 8. PAD, CV following. 9. Abnormal opacities in lungs again noted on CTA, Pulmo following. Diet: ADULT DIET; Regular; 4 carb choices (60 gm/meal);  Low Sodium (2 gm)  Code Status: Full Sade Nina MD

## 2021-06-06 NOTE — DISCHARGE SUMMARY
Hospital Medicine Discharge Summary    Patient ID: Yvette Mendoza      Patient's PCP: Laqueta Sever    Admit Date: 6/4/2021     Discharge Date:   06/06/2021    Admitting Physician: Heidi Younger DO     Discharge Physician: Kandis Del Cid MD     Discharge Diagnoses: Active Hospital Problems    Diagnosis     Heart block [I45.9]     Hypertensive urgency [I16.0]     ORALIA (acute kidney injury) ([facility] [address] [N17.9]     Generalized weakness [R53.1]     SOB (shortness of breath) [R06.02]     Pleural effusion, right [J90]     DM2 (diabetes mellitus, type 2) ([facility] [address] [E11.9]     Hypertensive emergency [I16.1]     Second degree AV block [I44.1]        The patient was seen and examined on day of discharge and this discharge summary is in conjunction with any daily progress note from day of discharge. Hospital Course:       1.  Second-degree type II AV block, cardiology following, discussed with Dr Danny Guerra, no further procedures or interventions recommended as inpatient for now. 2.  Hypertensive emergency, better controlled at this time, cardiology following, p.o. medications added. Ok to discharge per cardiology. 3.  Pulmonary mass/nodules, follow-up as outpatient and get PET scan or CT scan. Follow up with pulmo. Discussed with patient the absolute need to follow up to make sure not a cancer, he verbalized understanding and agreement. 4.  Bilateral pleural effusion, likely cardiac etiology, follow-up as outpatient with pulmonary for further monitoring and thoracentesis if deemed necessary  5.  Acute likely on chronic diastolic heart failure, likely due to uncontrolled hypertension, given p.o. Lasix for now  6.  Acute renal failure, creatinine 1.5 this am.   7. Renal artery stenosis, with focal area of renal ischemia. 8. PAD, CV following as outpatient. 9. Abnormal opacities in lungs again noted on CTA, Pulmo following. Follow up as outpatient.            Physical Exam Performed:     BP (!) 169/70 follow-up   recommended. 4. Complex right pleural effusion with pleural thickening. Correlate   clinically to exclude empyema. Follow-up recommended as malignant effusion   is in the differential.   5. Partially visualized infrarenal abdominal aortic aneurysm. CT abdomen   pelvis recommended for complete evaluation. 6. Other findings as described. XR CHEST PORTABLE   Final Result   Small right-sided pleural effusion. Mild area of patchy opacification at the right lower lung which could   represent atelectasis versus airspace disease process. Mild cardiomegaly. XR CHEST PORTABLE    (Results Pending)          Consults:     IP CONSULT TO CARDIOLOGY  IP CONSULT TO CARDIOLOGY  IP CONSULT TO SPIRITUAL SERVICES  IP CONSULT TO PULMONOLOGY    Disposition:  home     Condition at Discharge: Stable    Discharge Instructions/Follow-up:  PCP, CV, Pulmo     Code Status:  Full Code     Activity: activity as tolerated    Diet: cardiac diet and diabetic diet      Discharge Medications:     Current Discharge Medication List           Details   hydrALAZINE (APRESOLINE) 25 MG tablet Take 1 tablet by mouth every 8 hours  Qty: 90 tablet, Refills: 0              Details   metFORMIN (GLUCOPHAGE) 500 MG tablet Take 1 tablet by mouth 2 times daily (with meals)  Qty: 60 tablet, Refills: 0      amLODIPine (NORVASC) 10 MG tablet Take 1 tablet by mouth daily  Qty: 30 tablet, Refills: 0              Details   lisinopril (PRINIVIL;ZESTRIL) 40 MG tablet Take 40 mg by mouth daily      gabapentin (NEURONTIN) 600 MG tablet Take 900 mg by mouth daily. atorvastatin (LIPITOR) 80 MG tablet Take 80 mg by mouth every evening              Time Spent on discharge is more than 45 minutes in the examination, evaluation, counseling and review of medications and discharge plan. Signed:    Surya Delvalle MD   6/6/2021      Thank you Katelyn Cruz for the opportunity to be involved in this patient's care.  If you have

## 2021-06-07 ENCOUNTER — TELEPHONE (OUTPATIENT)
Dept: CARDIOLOGY CLINIC | Age: 66
End: 2021-06-07

## 2021-06-07 NOTE — TELEPHONE ENCOUNTER
Patient was discharged over the weekend. He was seen for Bradycardia, HTN. Per Dr. Maury Kohler he needs follow up with  Dr. Maury Kohler in 2-3 weeks. Please call to schedule.

## 2021-06-07 NOTE — TELEPHONE ENCOUNTER
I called and left Mikayla Molina a message to call the office back to schedule a 2-3 week hospital follow up

## 2021-06-07 NOTE — TELEPHONE ENCOUNTER
Dawson Gallegos called the office back, I have him scheduled with Dr. Gia Montaño for Thursday 6/24 at 2:45.

## 2021-06-09 NOTE — PROGRESS NOTES
Physician Progress Note      Rebecca Challenger  CSN #:                  [id number]  :                       1955  ADMIT DATE:       2021 8:22 PM  [address] [place] DATE:        2021 11:03 AM  RESPONDING  PROVIDER #:        Sultana Garcia MD          QUERY TEXT:    Dear Dr Joe Felton,    Patient admitted with CHF . Noted documentation of acute respiratory failure   in consult note  on  . In order to support the diagnosis of acute   respiratory failure, please include additional clinical indicators in your   documentation. Or please document if the diagnosis of acute respiratory   failure has been ruled out after further study. The medical record reflects the following:  Risk Factors: chf, pleural effusion, emphysema  Clinical Indicators: Documentation per consult note  of Acute hypoxemic   respiratory failure. On admission   rr 16, 90-94%ra  . Per ED-Resting   comfortably on the cart, no obvious pain, speaking in full sentences. Normal   breath sounds, no respiratory distress, no wheezing, no rales, no rhonchi. On    O2 applied , was 95%ra  and rr 15 at this time. Placed on 2l and 95%. Treatment: [email or web address], monitor o2 sats, monitor resp status. Pulmonology consult,   wean o2    Acute Respiratory Failure Clinical Indicators per [employer] Training Guide and   Quick Reference Guide:  pO2 < 60 mmHg or SpO2 (pulse oximetry) < 91% breathing room air  pCO2 > 50 and pH < 7.35  P/F ratio (pO2 / FIO2) < 300  pO2 decrease or pCO2 increase by 10 mmHg from baseline (if known)  Supplemental oxygen of 40% or more  Presence of respiratory distress, tachypnea, dyspnea, shortness of breath,   wheezing  Unable to speak in complete sentences  Use of accessory muscles to breathe  Extreme anxiety and feeling of impending doom  Tripod position  Confusion/altered mental status/obtunded      Thank You, [facility] Work RN [email or web address]  [phone]  Options provided:  -- Acute Respiratory Failure as evidenced by, Please document evidence. -- Acute Respiratory Failure ruled out after study  -- Other - I will add my own diagnosis  -- Disagree - Not applicable / Not valid  -- Disagree - Clinically unable to determine / Unknown  -- Refer to Clinical Documentation Reviewer    PROVIDER RESPONSE TEXT:    This patient is in acute respiratory failure as evidenced by < 90 % on room   air needing supplimentation    Query created by:  Work, Wendelin Hashimoto on 6/9/2021 12:15 PM      Electronically signed by:  Ursula Bernabe MD 6/9/2021 12:20 PM

## 2021-06-11 LAB
EKG ATRIAL RATE: 74 BPM
EKG DIAGNOSIS: NORMAL
EKG P AXIS: 57 DEGREES
EKG P-R INTERVAL: 166 MS
EKG Q-T INTERVAL: 622 MS
EKG QRS DURATION: 148 MS
EKG QTC CALCULATION (BAZETT): 488 MS
EKG R AXIS: 94 DEGREES
EKG T AXIS: 71 DEGREES
EKG VENTRICULAR RATE: 37 BPM

## 2021-06-18 NOTE — TELEPHONE ENCOUNTER
Pt returned call. He states that his ankles have been swelling for the past 2 days but denies SOB. He has been restricting his salt intake but does not weigh himself daily. Advised to continue salt restriction and to weigh himself daily. Also instructed to elevate legs as much as possible. Dr. Rekha Flores, any new recommendations? Pt did receive lasix while in the hospital. Thanks.

## 2021-06-18 NOTE — TELEPHONE ENCOUNTER
Records in [facility] reviewed. Pt was admitted for dHF, HTN emergency and 2nd degree AVB type 2. Also treated for ORALIA and has renal artery stenosis. Cr 1.5 6/6/21. Attempted to contact pt to get more information. MLOR to [facility]. Dr. Ester Ng progress note attached below. Discharged on amlodipine, hydralazine, lisinopril. May benefit from spironolactone which we will entertain as outpatient setting.   Follow-up with me in 2 to 3 weeks

## 2021-06-18 NOTE — TELEPHONE ENCOUNTER
Sonya Fawad called in this afternoon wanting to speak with Dr. Kaylynn Mederos. He stated his ankles are very swollen and he doesn't know if he can wait until Thursday 06/24 to be seen. Sonya Fawad stated he spoke with an on call doctor last night and the doctor told him to call our office to see if Dr. Kaylynn Mederos will prescribe him a water pill to help with the swelling. You can reach Sonya Celestin at [phone]  He stated leave a message and he will get back quickly if he doesn't answer.

## 2021-06-18 NOTE — TELEPHONE ENCOUNTER
Pt again encouraged to limit salt intake, restrict fluids to 64 oz per day, elevate legs and monitor weight. Pt will call with update on Monday and f/u with Dr. Jake Haas as scheduledThursday 6/24 at 2:45.

## 2021-06-22 RX ORDER — METHOCARBAMOL 750 MG/1
750 TABLET, FILM COATED ORAL 4 TIMES DAILY
COMMUNITY

## 2021-06-22 RX ORDER — DICLOFENAC SODIUM 75 MG/1
75 TABLET, DELAYED RELEASE ORAL 2 TIMES DAILY
COMMUNITY

## 2021-06-22 NOTE — TELEPHONE ENCOUNTER
Pt called back, he states that SOB has improved and his weight has been steady and he also has been monitor fluid intake. He is still having some swelling in ankles and feet. He also mention 2 new meds he is taking, med list has been updated. I informed of recs and stated that if there are any new recs before his appointment we will call him back but if not we will see him on the 24th.

## 2021-06-24 ENCOUNTER — OFFICE VISIT (OUTPATIENT)
Dept: CARDIOLOGY CLINIC | Age: 66
End: 2021-06-24
Payer: MEDICARE

## 2021-06-24 VITALS
WEIGHT: 166.4 LBS | HEART RATE: 78 BPM | DIASTOLIC BLOOD PRESSURE: 74 MMHG | HEIGHT: 67 IN | BODY MASS INDEX: 26.12 KG/M2 | SYSTOLIC BLOOD PRESSURE: 134 MMHG | OXYGEN SATURATION: 97 %

## 2021-06-24 DIAGNOSIS — I10 ESSENTIAL HYPERTENSION: ICD-10-CM

## 2021-06-24 DIAGNOSIS — I51.89 DIASTOLIC DYSFUNCTION: Primary | ICD-10-CM

## 2021-06-24 DIAGNOSIS — I70.1 RENAL ARTERY STENOSIS (HCC): ICD-10-CM

## 2021-06-24 DIAGNOSIS — I73.9 CLAUDICATION (HCC): ICD-10-CM

## 2021-06-24 DIAGNOSIS — I35.0 AORTIC VALVE STENOSIS, ETIOLOGY OF CARDIAC VALVE DISEASE UNSPECIFIED: ICD-10-CM

## 2021-06-24 DIAGNOSIS — R09.89 BILATERAL CAROTID BRUITS: ICD-10-CM

## 2021-06-24 PROCEDURE — 3017F COLORECTAL CA SCREEN DOC REV: CPT | Performed by: INTERNAL MEDICINE

## 2021-06-24 PROCEDURE — 1036F TOBACCO NON-USER: CPT | Performed by: INTERNAL MEDICINE

## 2021-06-24 PROCEDURE — 1123F ACP DISCUSS/DSCN MKR DOCD: CPT | Performed by: INTERNAL MEDICINE

## 2021-06-24 PROCEDURE — G8427 DOCREV CUR MEDS BY ELIG CLIN: HCPCS | Performed by: INTERNAL MEDICINE

## 2021-06-24 PROCEDURE — G8417 CALC BMI ABV UP PARAM F/U: HCPCS | Performed by: INTERNAL MEDICINE

## 2021-06-24 PROCEDURE — 1111F DSCHRG MED/CURRENT MED MERGE: CPT | Performed by: INTERNAL MEDICINE

## 2021-06-24 PROCEDURE — 4040F PNEUMOC VAC/ADMIN/RCVD: CPT | Performed by: INTERNAL MEDICINE

## 2021-06-24 PROCEDURE — 99213 OFFICE O/P EST LOW 20 MIN: CPT | Performed by: INTERNAL MEDICINE

## 2021-06-24 NOTE — PROGRESS NOTES
[facility]  Cardiology Consult    Leonard Milton  1955    June 24, 2021      Reason for Referral: Aortic stenosis    CC: \"I am feeling okay. \"      Subjective:     History of Present Illness:    Leonard Milton is a 77 y.o. patient with a PMH significant for hypertension, diabetes and hyperlipidemia. He presented to [facility] emergency room on 6/4/2021 with complaints of increased shortness of breath. It was also noted that he had bradycardia and hypertension. Today, he is here for hospital follow up. He says that he is feeling much better and his blood pressure has been well controlled since being discharged. Patient denies exertional chest pain/pressure, dyspnea at rest, MORALEZ, PND, orthopnea, palpitations, lightheadedness, weight changes, changes in LE edema, and syncope. Past Medical History:   has a past medical history of Diabetes mellitus ([address] Hepatitis C, Hyperlipidemia, Hypertension, Kidney damage, and Neuropathy. Surgical History:   has a past surgical history that includes Abdominal aortic aneurysm repair (2019); Colonoscopy; Intracapsular cataract extraction (Left, 09/15/2020); Intracapsular cataract extraction (Right, 09/29/2020); back surgery; back surgery (2016); and Tonsillectomy. Social History:   reports that he has quit smoking. His smoking use included cigarettes. He has never used smokeless tobacco. He reports current alcohol use. He reports that he does not use drugs. Family History:  family history includes Cancer in his father and mother. Home Medications:  Were reviewed and are listed in nursing record and/or below  Prior to Admission medications    Medication Sig Start Date End Date Taking?  Authorizing Provider   methocarbamol (ROBAXIN) 750 MG tablet Take 750 mg by mouth 4 times daily   Yes Historical Provider, MD   diclofenac (VOLTAREN) 75 MG EC tablet Take 75 mg by mouth 2 times daily   Yes Historical Provider, MD   metFORMIN (GLUCOPHAGE) 500 MG tablet Take 1 tablet by mouth 2 times daily (with meals) 6/9/21  Yes Zac Hurley MD   hydrALAZINE (APRESOLINE) 25 MG tablet Take 1 tablet by mouth every 8 hours 6/6/21  Yes Zac Nolasco MD   amLODIPine (NORVASC) 10 MG tablet Take 1 tablet by mouth daily 6/7/21  Yes Zac Nolasco MD   lisinopril (PRINIVIL;ZESTRIL) 40 MG tablet Take 40 mg by mouth daily 12/21/20  Yes Historical Provider, MD   gabapentin (NEURONTIN) 600 MG tablet Take 900 mg by mouth daily. Yes Historical Provider, MD   atorvastatin (LIPITOR) 80 MG tablet Take 80 mg by mouth every evening    Yes Historical Provider, MD        CURRENT Medications:  No current facility-administered medications for this visit. Allergies:  Patient has no known allergies. Review of Systems: SEE HPI   · Constitutional: no unanticipated weight loss. There's been no change in energy level, sleep pattern, or activity level. No fevers, chills. · Eyes: No visual changes or diplopia. No scleral icterus. · ENT: No Headaches, hearing loss or vertigo. No mouth sores or sore throat. · Cardiovascular: No Chest pain, tightness or discomfort.  No Shortness of breath. No Dyspnea on exertion, Orthopnea, Paroxysmal nocturnal dyspnea or breathlessness at rest.   No Palpitations.  No Syncope ('blackouts', 'faints', 'collapse') or dizziness. · Respiratory: No cough or wheezing, no sputum production. No hematemesis. · Gastrointestinal: No abdominal pain, appetite loss, blood in stools. No change in bowel or bladder habits. · Genitourinary: No dysuria, trouble voiding, or hematuria. · Musculoskeletal:  No gait disturbance, no joint complaints. · Integumentary: No rash or pruritis. · Neurological: No headache, diplopia, change in muscle strength, numbness or tingling. · Psychiatric: No anxiety or depression. · Endocrine: No temperature intolerance. No excessive thirst, fluid intake, or urination. No tremor.   · Hematologic/Lymphatic: No abnormal bruising or bleeding, blood clots or swollen lymph nodes. · Allergic/Immunologic: No nasal congestion or hives. Objective:     PHYSICAL EXAM:      Vitals:    06/24/21 1503   BP: 134/74   Pulse: 78   SpO2: 97%    Weight: 166 lb 6.4 oz (75.5 kg)       General Appearance:  Alert, cooperative, no distress, appears stated age. Head:  Normocephalic, without obvious abnormality, atraumatic. Eyes:  Pupils equal and round. No scleral icterus. Mouth: Moist mucosa, no pharyngeal erythema. Nose: Nares normal. No drainage or sinus tenderness. Neck: Supple, symmetrical, trachea midline. No adenopathy. No tenderness/mass/nodules. No carotid bruit or elevated JVD. Lungs:   Respiratory Effort: Normal   Auscultation: Clear to auscultation bilaterally, respirations unlabored. No wheeze, rales   Chest Wall:  No tenderness or deformity. Cardiovascular:    Pulses  Palpation: normal   Ascultation: Regular rate, S1/ S2 normal. No murmur, rub, or gallop. 2+ radial and pedal pulses, symmetric  Carotid  Femoral   Abdomen and Gastrointestinal:   Soft, non-tender, bowel sounds active. Liver and Spleen  Masses   Musculoskeletal: No muscle wasting  Back  Gait   Extremities: Extremities normal, atraumatic. No cyanosis or edema. No cyanosis clubbing       Skin: Inspection and palpation performed, no rashes or lesions. Pysch: Normal mood and affect.  Alert and oriented to time place person   Neurologic: Normal gross motor and sensory exam.       Labs     All labs have been reviewed    Lab Results   Component Value Date    WBC 7.6 06/06/2021    RBC 4.34 06/06/2021    HGB 13.8 06/06/2021    HCT 39.5 06/06/2021    MCV 90.8 06/06/2021    RDW 13.0 06/06/2021     06/06/2021     Lab Results   Component Value Date     06/06/2021    K 4.2 06/06/2021    K 4.1 06/04/2021     06/06/2021    CO2 26 06/06/2021    BUN 32 06/06/2021    CREATININE 1.5 06/06/2021    GFRAA 56 06/06/2021    AGRATIO 1.9 06/04/2021    LABGLOM 47 06/06/2021    GLUCOSE 114 06/06/2021    PROT 6.1 06/04/2021    CALCIUM 9.8 06/06/2021    BILITOT 0.6 06/04/2021    ALKPHOS 90 06/04/2021    AST 17 06/04/2021    ALT 9 06/04/2021     No results found for: PTINR  No results found for: LABA1C  Lab Results   Component Value Date    TROPONINI <0.01 06/05/2021       Cardiac, Vascular and Imaging Data all Personally Reviewed in Detail by Myself      EKG:     Echocardiogram:   ECHO 6/5/2021   There is mild concentric left ventricular hypertrophy. Ejection fraction is visually estimated to be 55-60%. No regional wall motion abnormalities are noted. Grade II diastolic dysfunction with elevated LV filling pressures. Calcification of the mitral valve noted. Trivial mitral regurgitation. No evidence of mitral stenosis. The left atrium is dilated. mild- aortic stenosis with a mean pressure gradient of 16mmHg. Trivial aortic regurgitation. Mildly dilated right ventricle. Right ventricular systolic function is normal .    Stress Test:     Cath: Other imaging:     Assessment and Plan     Diastolic dysfunction  Appears compensated today. Continue current medical management. Hypertension  Controlled. Continue current medical management. Mild aortic stenosis  Asymptomatic. Will monitor with echocardiogram every 6 months. Carotid bruit  Carotid doppler    Renal artery stenosis  Renal doppler. Claudication  Assess with bilateral lower extremity arterial doppler. Follow up in 6 months. Thank you for allowing us to participate in the care of [facility]. Please do not hesitate to contact me if you have any questions.     Chaparrita Pierson MD, MPH    [place] [address]  Ph: [phone]  Fax: [phone]      This note was scribed in the presence of Dr Darrell Maciel, by Kate Quinteros RN  Physician Attestation:  The scribes documentation has been prepared under my direction and personally reviewed by me in its entirety. I confirm that the note above accurately reflects all work, treatment, procedures, and medical decision making performed by me.

## 2021-06-24 NOTE — PATIENT INSTRUCTIONS
good for your heart. · Choose low-fat or fat-free milk and dairy products. Eat foods high in fiber  · Eat a variety of grain products every day. Include whole-grain foods that have lots of fiber and nutrients. Examples of whole-grain foods include oats, whole wheat bread, and brown rice. · Buy whole-grain breads and cereals, instead of white bread or pastries. Limit salt and sodium  · Limit how much salt and sodium you eat to help lower your blood pressure. · Taste food before you salt it. Add only a little salt when you think you need it. With time, your taste buds will adjust to less salt. · Eat fewer snack items, fast foods, and other high-salt, processed foods. Check food labels for the amount of sodium in packaged foods. · Choose low-sodium versions of canned goods (such as soups, vegetables, and beans). Limit sugar  · Limit drinks and foods with added sugar. These include candy, desserts, and soda pop. Limit alcohol  · Limit alcohol to no more than 2 drinks a day for men and 1 drink a day for women. Too much alcohol can cause health problems. When should you call for help? Watch closely for changes in your health, and be sure to contact your doctor if:    · You would like help planning heart-healthy meals. Where can you learn more? Go to https://[email or web address]. org and sign in to your [employer] account. Enter V137 in the [facility] box to learn more about \"Heart-Healthy Diet: Care Instructions. \"     If you do not have an account, please click on the \"Sign Up Now\" link. Current as of: December 17, 2020               Content Version: 12.9  © 3040-8032 Healthwise, [employer]. Care instructions adapted under license by [facility] ([facility]). If you have questions about a medical condition or this instruction, always ask your healthcare professional. [address] any warranty or liability for your use of this information.

## 2021-07-06 ENCOUNTER — TELEPHONE (OUTPATIENT)
Dept: CARDIOLOGY CLINIC | Age: 66
End: 2021-07-06

## 2021-07-06 RX ORDER — HYDRALAZINE HYDROCHLORIDE 25 MG/1
25 TABLET, FILM COATED ORAL EVERY 8 HOURS SCHEDULED
Qty: 90 TABLET | Refills: 1 | Status: SHIPPED | OUTPATIENT
Start: 2021-07-06 | End: 2021-07-08 | Stop reason: SDUPTHER

## 2021-07-06 RX ORDER — AMLODIPINE BESYLATE 10 MG/1
10 TABLET ORAL DAILY
Qty: 90 TABLET | Refills: 1 | Status: SHIPPED | OUTPATIENT
Start: 2021-07-06

## 2021-07-06 RX ORDER — HYDRALAZINE HYDROCHLORIDE 25 MG/1
25 TABLET, FILM COATED ORAL EVERY 8 HOURS SCHEDULED
Qty: 270 TABLET | Refills: 1 | OUTPATIENT
Start: 2021-07-06

## 2021-07-06 NOTE — TELEPHONE ENCOUNTER
Last OV: 06/24/2021  Next OV: not scheduled  Most recent Labs:CBC 06/06/2021  BMP 06/24/2021   Last PT/INR (if needed):-  Last EKG (if needed):06/04/2021

## 2021-07-06 NOTE — TELEPHONE ENCOUNTER
Medication Refill    Medication needing refilled:  Hydralazine  Amlodipine    Dosage of the medication:  25 mg  10 mg      How are you taking this medication (QD, BID, TID, QID, PRN):  Take 1 tablet by mouth every 8 hours  Take 1 tablet by mouth daily    30 or 90 day supply called in:  90 90    When will you run out of your medication:    Out of both medications    Which Pharmacy are we sending the medication to?:    [facility] [address] [phone] Ernesto Matos [facility] [address], [address] [address]   Phone:  [phone]  Fax:  [phone]

## 2021-07-08 RX ORDER — HYDRALAZINE HYDROCHLORIDE 25 MG/1
25 TABLET, FILM COATED ORAL EVERY 8 HOURS SCHEDULED
Qty: 270 TABLET | Refills: 1 | Status: SHIPPED | OUTPATIENT
Start: 2021-07-08

## 2021-07-12 ENCOUNTER — TELEPHONE (OUTPATIENT)
Dept: CARDIOLOGY CLINIC | Age: 66
End: 2021-07-12

## 2021-07-12 ENCOUNTER — NURSE ONLY (OUTPATIENT)
Dept: CARDIOLOGY CLINIC | Age: 66
End: 2021-07-12
Payer: MEDICARE

## 2021-07-12 VITALS — OXYGEN SATURATION: 95 % | HEART RATE: 44 BPM | SYSTOLIC BLOOD PRESSURE: 118 MMHG | DIASTOLIC BLOOD PRESSURE: 60 MMHG

## 2021-07-12 DIAGNOSIS — R00.1 BRADYCARDIA: ICD-10-CM

## 2021-07-12 DIAGNOSIS — I10 ESSENTIAL HYPERTENSION: Primary | ICD-10-CM

## 2021-07-12 PROCEDURE — 93000 ELECTROCARDIOGRAM COMPLETE: CPT | Performed by: INTERNAL MEDICINE

## 2021-07-12 NOTE — TELEPHONE ENCOUNTER
Spoke to Margarette Jc and he said that he has been light headed over the last couple of days. He states that his heart rate is in the 40's at home. I asked him to come to the office to have an EKG and get his vitals. He also said that he was started on a diuretic and thinks that it started around the same time.

## 2021-07-12 NOTE — TELEPHONE ENCOUNTER
Renata Bolds called in this afternoon stating that for the past 4-5 days he has been feeling light headed, and both ankles are swollen. When he is standing up, he has to hold on to something so he doesn't fall. He said is \"pulse rate is 7 beats in 10 seconds. \" He said his weight has been good, BP has been fine, he just has been so light headed.        You can reach Brianyumiko Arellano at #[phone]

## 2021-07-12 NOTE — PROGRESS NOTES
Patient in the office with increased dizziness. Discussed with Dr Britt Solis and Dr Linda Dean. Will place 30 day event monitor.

## 2021-07-13 ENCOUNTER — TELEPHONE (OUTPATIENT)
Dept: CARDIOLOGY CLINIC | Age: 66
End: 2021-07-13

## 2021-07-13 NOTE — TELEPHONE ENCOUNTER
Called/spoke to Uma with [employer]. She states that pt's with Medicare usually have no OOP expense unless it's a copay that a pt is responsible for. Gave her pt's name and insurance info and she will check into this to find out what pt's estimated responsibility may be.

## 2021-07-13 NOTE — TELEPHONE ENCOUNTER
Pt dropped monitor off to our office today. I spoke to David Thrasher and the estimated OOP cost would have been $25. Pt had some other questions/concerns before wearing a monitor. CARROLL Mcgraw went into the pt room to talk about his concern. Pt decided not to wear the monitor at this time.

## 2021-07-13 NOTE — TELEPHONE ENCOUNTER
Could you check with [employer] and see what the patient out of pocket cost for the monitor is.   Thanks,  Malik Blas RN

## 2021-07-13 NOTE — TELEPHONE ENCOUNTER
Tessa Friend called in this morning after being seen in office yesterday. He states that he cannot afford the holter monitor that was given to him yesterday and is going to be bringing it back sometime today.  He also wanted to let Dr. Jake Coon know that the water pill that his PCP prescribed him is the Hydrochlorothiazide, 12.5 mg tablet 1X daily    He can be reached at [phone]

## 2021-07-13 NOTE — TELEPHONE ENCOUNTER
I spoke with patient he states the he gets lightheaded when he bends over or when he gets up from sitting to standing to quickly. He states that the monitor is too much work and he does not feel it is needed. I instructed him to contact our office if his symptoms worsen or if he was to experience any syncopal episodes. Verbalized understanding.

## 2021-07-16 ENCOUNTER — TELEPHONE (OUTPATIENT)
Dept: CARDIOLOGY CLINIC | Age: 66
End: 2021-07-16

## 2021-07-16 NOTE — TELEPHONE ENCOUNTER
Sugey Villarreal is calling stating his family doctor Dr. Enrrique Cedillo told him he has a mass on the left side of his neck and is sending him to ENT doctor. ( he has not scheduled the office visit yet for ENT)        Sugey Villarreal wants to know if he should go ahead with his test next week that Sylvie Wiggins ordered?    On 7/21/20  Renal Artery, Arterial Doppler Bilat lower, Carotid doppler study    Sugey Villedag can be reached at [phone]

## 2021-07-21 ENCOUNTER — HOSPITAL ENCOUNTER (OUTPATIENT)
Dept: VASCULAR LAB | Age: 66
Discharge: HOME | End: 2021-07-21
Payer: MEDICARE

## 2021-07-21 DIAGNOSIS — I73.9 CLAUDICATION (HCC): ICD-10-CM

## 2021-07-21 DIAGNOSIS — R09.89 BILATERAL CAROTID BRUITS: ICD-10-CM

## 2021-07-21 PROCEDURE — 93880 EXTRACRANIAL BILAT STUDY: CPT

## 2021-07-21 PROCEDURE — 93925 LOWER EXTREMITY STUDY: CPT

## 2021-07-26 ENCOUNTER — HOSPITAL ENCOUNTER (OUTPATIENT)
Dept: VASCULAR LAB | Age: 66
Discharge: HOME | End: 2021-07-26
Payer: MEDICARE

## 2021-07-26 PROCEDURE — 93975 VASCULAR STUDY: CPT

## 2021-07-29 ENCOUNTER — TELEPHONE (OUTPATIENT)
Dept: CARDIOLOGY CLINIC | Age: 66
End: 2021-07-29

## 2021-07-29 NOTE — TELEPHONE ENCOUNTER
Please let him know that Dr Caitie Maradiaga is out of the office and will review when he returns to the office.

## 2021-07-29 NOTE — TELEPHONE ENCOUNTER
Leticia Naqvi called in and is awaiting the results of his Doppler that was performed on 7/26    He can be contacted back at [phone]

## 2021-07-30 NOTE — TELEPHONE ENCOUNTER
Called patient with message from HOSEA TERESA [facility]. Patient verbalized and confirmed understanding.

## 2022-01-18 ENCOUNTER — TELEPHONE (OUTPATIENT)
Dept: CARDIOLOGY CLINIC | Age: 67
End: 2022-01-18

## 2022-01-18 NOTE — TELEPHONE ENCOUNTER
Called to schedule f/up appointment.  He states that he is seeing another cardiologist and does not want to schedule a f/up

## (undated) DEVICE — STOPCOCK IV PRIMING 0.26ML 3 W W/ TWO FEM LUERLOK PRT 1

## (undated) DEVICE — SOLUTION IRRIGATION BAL SALT SOLUTION 15 ML STRL BSS

## (undated) DEVICE — MICROSURGICAL INSTRUMENT "J" SHAPED CANNULA 27GA: Brand: ALCON

## (undated) DEVICE — Device

## (undated) DEVICE — SOLUTION IRRIG BSS ST 500ML

## (undated) DEVICE — GLOVE SURG SZ 7.5 L11.73IN FNGR THK9.8MIL STRW LTX POLYMER

## (undated) DEVICE — SYSTEM FLD MGMT DIA0.9MM 45DEG ULT BAL VISN ACT INTREPID

## (undated) DEVICE — 1 ML TUBERCULIN SYRINGE REGULAR TIP: Brand: MONOJECT

## (undated) DEVICE — SOLUTION IV IRRIG WATER 500ML POUR BRL ST 2F7113

## (undated) DEVICE — SYRINGE 30CC LUER LOCK: Brand: CARDINAL HEALTH